# Patient Record
Sex: FEMALE | Race: WHITE | NOT HISPANIC OR LATINO | Employment: OTHER | URBAN - METROPOLITAN AREA
[De-identification: names, ages, dates, MRNs, and addresses within clinical notes are randomized per-mention and may not be internally consistent; named-entity substitution may affect disease eponyms.]

---

## 2017-06-02 ENCOUNTER — HOSPITAL ENCOUNTER (OUTPATIENT)
Facility: MEDICAL CENTER | Age: 37
End: 2017-06-02
Attending: FAMILY MEDICINE
Payer: COMMERCIAL

## 2017-06-02 ENCOUNTER — OFFICE VISIT (OUTPATIENT)
Dept: URGENT CARE | Facility: CLINIC | Age: 37
End: 2017-06-02
Payer: COMMERCIAL

## 2017-06-02 VITALS
BODY MASS INDEX: 20.99 KG/M2 | RESPIRATION RATE: 16 BRPM | OXYGEN SATURATION: 97 % | TEMPERATURE: 97.8 F | WEIGHT: 130 LBS | HEART RATE: 92 BPM | DIASTOLIC BLOOD PRESSURE: 70 MMHG | SYSTOLIC BLOOD PRESSURE: 112 MMHG

## 2017-06-02 DIAGNOSIS — N30.01 ACUTE CYSTITIS WITH HEMATURIA: ICD-10-CM

## 2017-06-02 LAB
APPEARANCE UR: NORMAL
BILIRUB UR STRIP-MCNC: NORMAL MG/DL
COLOR UR AUTO: NORMAL
GLUCOSE UR STRIP.AUTO-MCNC: NEGATIVE MG/DL
KETONES UR STRIP.AUTO-MCNC: NEGATIVE MG/DL
LEUKOCYTE ESTERASE UR QL STRIP.AUTO: NORMAL
NITRITE UR QL STRIP.AUTO: NORMAL
PH UR STRIP.AUTO: 5 [PH] (ref 5–8)
PROT UR QL STRIP: 100 MG/DL
RBC UR QL AUTO: NORMAL
SP GR UR STRIP.AUTO: 1.03
UROBILINOGEN UR STRIP-MCNC: 8 MG/DL

## 2017-06-02 PROCEDURE — 87186 SC STD MICRODIL/AGAR DIL: CPT

## 2017-06-02 PROCEDURE — 99203 OFFICE O/P NEW LOW 30 MIN: CPT | Performed by: FAMILY MEDICINE

## 2017-06-02 PROCEDURE — 81002 URINALYSIS NONAUTO W/O SCOPE: CPT | Performed by: FAMILY MEDICINE

## 2017-06-02 PROCEDURE — 99000 SPECIMEN HANDLING OFFICE-LAB: CPT | Performed by: FAMILY MEDICINE

## 2017-06-02 PROCEDURE — 87077 CULTURE AEROBIC IDENTIFY: CPT

## 2017-06-02 PROCEDURE — 87086 URINE CULTURE/COLONY COUNT: CPT

## 2017-06-02 RX ORDER — NITROFURANTOIN 25; 75 MG/1; MG/1
100 CAPSULE ORAL EVERY 12 HOURS
Qty: 10 CAP | Refills: 0 | Status: SHIPPED | OUTPATIENT
Start: 2017-06-02 | End: 2017-06-07

## 2017-06-02 RX ORDER — ALPRAZOLAM 1 MG/1
1 TABLET ORAL NIGHTLY PRN
COMMUNITY

## 2017-06-02 RX ORDER — CITALOPRAM HYDROBROMIDE 10 MG/1
10 TABLET ORAL DAILY
COMMUNITY
End: 2017-09-08

## 2017-06-02 ASSESSMENT — ENCOUNTER SYMPTOMS
DIZZINESS: 0
ARTHRALGIAS: 0
MYALGIAS: 0
ABDOMINAL PAIN: 0
ANOREXIA: 0
FEVER: 0
VOMITING: 0
SHORTNESS OF BREATH: 0
CHILLS: 1
FATIGUE: 1
NAUSEA: 0

## 2017-06-02 NOTE — MR AVS SNAPSHOT
Kimmie Bellpson   2017 4:45 PM   Office Visit   MRN: [id number]    Department:  [facility]   Dept Phone:  [phone]    Description:  Female : 1980   Provider:  Severo Espinosa M.D.           Reason for Visit     Urinary Frequency x today, urinary frequency and burning w/ urination      Allergies as of 2017     Allergen Noted Reactions    Pcn [Penicillins] 2010         Vital Signs     Blood Pressure Pulse Temperature Respirations Weight Oxygen Saturation    112/70 mmHg 92 36.6 °C (97.8 °F) 16 58.968 kg (130 lb) 97%      Basic Information     Date Of Birth Sex Race Ethnicity Preferred Language    1980 Female White Non- English      Problem List              ICD-10-CM Priority Class Noted - Resolved    Psoriasis L40.9   Unknown - Present    Acne vulgaris L70.0   Unknown - Present      Health Maintenance     Patient has no pending health maintenance at this time      Current Immunizations     No immunizations on file.      Below and/or attached are the medications your provider expects you to take. Review all of your home medications and newly ordered medications with your provider and/or pharmacist. Follow medication instructions as directed by your provider and/or pharmacist. Please keep your medication list with you and share with your provider. Update the information when medications are discontinued, doses are changed, or new medications (including over-the-counter products) are added; and carry medication information at all times in the event of emergency situations     Allergies:  PCN - (reactions not documented)               Medications  Valid as of: 2017 -  5:14 PM    Generic Name Brand Name Tablet Size Instructions for use    Acetaminophen   Take  by mouth.        ALPRAZolam (Tab) XANAX 1 MG Take 1 mg by mouth at bedtime as needed for Sleep.        Betamethasone Dipropionate (Cream) DIPROLENE 0.05 % Apply  to affected area(s) every day.        Citalopram Hydrobromide (Tab) CELEXA 10 MG Take 10 mg by mouth every day.        Diphenoxylate-Atropine (Tab) LOMOTIL 2.5-0.025 MG Take 1 Tab by mouth 4 times a day as needed for Diarrhea.        Hydrocodone-Chlorpheniramine (Liquid CR) TUSSIONEX 8-10 MG/5ML Take 5 mL by mouth every 12 hours as needed for Cough.        Ibuprofen   Take  by mouth.        Ipratropium Bromide HFA (Aero Soln) ATROVENT 17 MCG/ACT Inhale 2 Puffs by mouth 2 times a day.        Levonorgestrel (IUD) MIRENA 20 MCG/24HR 1 Each by Intrauterine route Once.        LORazepam (Tab) ATIVAN 0.5 MG Take 1 Tab by mouth every 8 hours as needed for Anxiety.        Minocycline HCl (Tab) DYNACIN 100 MG Take 1 Tab by mouth every day.        Sertraline HCl (Tab) ZOLOFT 50 MG Take 1 Tab by mouth every day.        Zolpidem Tartrate (Tab CR) AMBIEN CR 12.5 MG Take 1 Tab by mouth at bedtime as needed for Sleep.        Zolpidem Tartrate (Tab) AMBIEN 10 MG Take 1 Tab by mouth at bedtime as needed for Sleep.        .                 Medicines prescribed today were sent to:     [facility][id number] - [place] - [address] [address]    Phone: [phone] Fax: [phone]    Open 24 Hours?: No      Medication refill instructions:       If your prescription bottle indicates you have medication refills left, it is not necessary to call your provider’s office. Please contact your pharmacy and they will refill your medication.    If your prescription bottle indicates you do not have any refills left, you may request refills at any time through one of the following ways: The online [employer] system (except Urgent Care), by calling your provider’s office, or by asking your pharmacy to contact your provider’s office with a refill request. Medication refills are processed only during regular business hours and may not be available until the next business day. Your provider may request additional information or to have a follow-up visit with you prior to  refilling your medication.   *Please Note: Medication refills are assigned a new Rx number when refilled electronically. Your pharmacy may indicate that no refills were authorized even though a new prescription for the same medication is available at the pharmacy. Please request the medicine by name with the pharmacy before contacting your provider for a refill.           Loaded Commerce Access Code: [email or web address]  Expires: 7/2/2017  5:14 PM    Your email address is not on file at [facility].  Email Addresses are required for you to sign up for [employer]ed Commerce, please contact [phone] to verify your personal information and to provide your email address prior to attempting to register for Loaded Commerce.    Kimmie Elder  [address] [place] [address]    Loaded Commerce  A secure, online tool to manage your health information     [facility]’s Loaded Commerce® is a secure, online tool that connects you to your personalized health information from the privacy of your home -- day or night - making it very easy for you to manage your healthcare. Once the activation process is completed, you can even access your medical information using the Loaded Commerce lucian, which is available for free in the Apple Lucian store or Google Play store.     To learn more about Loaded Commerce, visit [email or web address]    There are two levels of access available (as shown below):   My Chart Features  [facility] Primary Care Doctor [facility]  Specialists [facility]  Urgent  Care Non-[facility] Primary Care Doctor   Email your healthcare team securely and privately 24/7 X X X    Manage appointments: schedule your next appointment; view details of past/upcoming appointments X      Request prescription refills. X      View recent personal medical records, including lab and immunizations X X X X   View health record, including health history, allergies, medications X X X X   Read reports about your outpatient visits, procedures, consult and ER notes X X X X   See your discharge summary, which  is a recap of your hospital and/or ER visit that includes your diagnosis, lab results, and care plan X X  X     How to register for [employer]MeYourLiteracy:  Once your e-mail address has been verified, follow the following steps to sign up for LendMeYourLiteracy.     1. Go to  [email or web address]  2. Click on the Sign Up Now box, which takes you to the New Member Sign Up page. You will need to provide the following information:  a. Enter your LendMeYourLiteracy Access Code exactly as it appears at the top of this page. (You will not need to use this code after you’ve completed the sign-up process. If you do not sign up before the expiration date, you must request a new code.)   b. Enter your date of birth.   c. Enter your home email address.   d. Click Submit, and follow the next screen’s instructions.  3. Create a LendMeYourLiteracy ID. This will be your LendMeYourLiteracy login ID and cannot be changed, so think of one that is secure and easy to remember.  4. Create a LendMeYourLiteracy password. You can change your password at any time.  5. Enter your Password Reset Question and Answer. This can be used at a later time if you forget your password.   6. Enter your e-mail address. This allows you to receive e-mail notifications when new information is available in LendMeYourLiteracy.  7. Click Sign Up. You can now view your health information.    For assistance activating your L[employer]MeYourLiteracy account, call [phone]         Quit Tobacco Information     Do you want to quit using tobacco?    Quitting tobacco decreases risks of cancer, heart and lung disease, increases life expectancy, improves sense of taste and smell, and increases spending money, among other benefits.    If you are thinking about quitting, we can help.  • [facility] Quit Tobacco Program: 821.925.9040  o Program occurs weekly for four weeks and includes pharmacist consultation on products to support quitting smoking or chewing tobacco. A provider referral is needed for pharmacist consultation.  • Tobacco Users Help Hotline:  1-800-QUIT-NOW (155-7384) or https://[place].quitlogix.org/  o Free, confidential telephone and online coaching for [place] residents. Sessions are designed on a schedule that is convenient for you. Eligible clients receive free nicotine replacement therapy.  • Nationally: www.smokefree.gov  o Information and professional assistance to support both immediate and long-term needs as you become, and remain, a non-smoker. Smokefree.gov allows you to choose the help that best fits your needs.

## 2017-06-03 NOTE — PROGRESS NOTES
Subjective:      Kimmie Elder is a 36 y.o. female who presents with Urinary Frequency            Urinary Frequency  This is a new problem. The current episode started today. The problem has been rapidly worsening. Associated symptoms include chills and fatigue. Pertinent negatives include no abdominal pain, anorexia, arthralgias, chest pain, fever, myalgias, nausea, rash or vomiting. Exacerbated by: urination. Treatments tried: AZO. The treatment provided no relief.       Review of Systems   Constitutional: Positive for chills and fatigue. Negative for fever.   Respiratory: Negative for shortness of breath.    Cardiovascular: Negative for chest pain.   Gastrointestinal: Negative for nausea, vomiting, abdominal pain and anorexia.   Musculoskeletal: Negative for myalgias and arthralgias.   Skin: Negative for rash.   Neurological: Negative for dizziness.     PMH:  has a past medical history of Psoriasis (age 16) and Acne vulgaris.  MEDS:   Current outpatient prescriptions:   •  citalopram (CELEXA) 10 MG tablet, Take 10 mg by mouth every day., Disp: , Rfl:   •  alprazolam (XANAX) 1 MG Tab, Take 1 mg by mouth at bedtime as needed for Sleep., Disp: , Rfl:   •  lorazepam (ATIVAN) 0.5 MG TABS, Take 1 Tab by mouth every 8 hours as needed for Anxiety., Disp: 25 Each, Rfl: 0  •  diphenoxylate-atropine (LOMOTIL) 2.5-0.025 MG TABS, Take 1 Tab by mouth 4 times a day as needed for Diarrhea., Disp: 30 Each, Rfl: 0  •  Ibuprofen (ADVIL PO), Take  by mouth., Disp: , Rfl:   •  Acetaminophen (TYLENOL PO), Take  by mouth., Disp: , Rfl:   •  levonorgestrel (MIRENA) 20 MCG/24HR IUD, 1 Each by Intrauterine route Once., Disp: , Rfl:   •  chlorpheniramine-hydrocodone (TUSSIONEX) 8-10 MG/5ML suspension, Take 5 mL by mouth every 12 hours as needed for Cough., Disp: 200 mL, Rfl: 0  •  ipratropium (ATROVENT HFA) 17 MCG/ACT AERS, Inhale 2 Puffs by mouth 2 times a day., Disp: 1 Inhaler, Rfl: 0  •  zolpidem (AMBIEN) 10 MG TABS, Take 1 Tab by  mouth at bedtime as needed for Sleep., Disp: 15 Each, Rfl: 3  •  zolpidem (AMBIEN CR) 12.5 MG CR tablet, Take 1 Tab by mouth at bedtime as needed for Sleep., Disp: 15 Tab, Rfl: 3  •  minocycline (DYNACIN) 100 MG tablet, Take 1 Tab by mouth every day., Disp: 60 Tab, Rfl: 11  •  sertraline (ZOLOFT) 50 MG TABS, Take 1 Tab by mouth every day., Disp: 30 Each, Rfl: 11  •  betamethasone dipropionate (DIPROLENE) 0.05 % CREA, Apply  to affected area(s) every day., Disp: , Rfl:   ALLERGIES:   Allergies   Allergen Reactions   • Pcn [Penicillins]      SURGHX:   Past Surgical History   Procedure Laterality Date   • Appendectomy  age 10     SOCHX:  reports that she has been smoking Cigarettes.  She does not have any smokeless tobacco history on file. She reports that she drinks alcohol.  FH: Family history was reviewed, no pertinent findings to report       Objective:     /70 mmHg  Pulse 92  Temp(Src) 36.6 °C (97.8 °F)  Resp 16  Wt 58.968 kg (130 lb)  SpO2 97%     Physical Exam   Constitutional: She appears well-developed. No distress.   HENT:   Head: Normocephalic.   Cardiovascular: Normal rate, regular rhythm and normal heart sounds.  Exam reveals no friction rub.    No murmur heard.  Pulmonary/Chest: Effort normal. No respiratory distress. She has no wheezes.   Abdominal: Soft. She exhibits no distension. There is tenderness in the suprapubic area. There is no rebound and no guarding.   No organomegaly   Neurological: She is alert.   Skin: Skin is dry. No rash noted. She is not diaphoretic.   Psychiatric: She has a normal mood and affect. Her behavior is normal.   no CVAT            Assessment/Plan:     1. Acute cystitis with hematuria [N30.01]  Urine Culture    nitrofurantoin monohydr macro (MACROBID) 100 MG Cap     Supportive care  Push fluids  Monitor temperature  Follow-up if symptoms worsen or fail to improve      ADDENDUM:  June 5, 2017  8:40 AM  Called and left message on voice mail  Culture result POSITIVE,  but SENSITIVE to nitrofurantoin  Advised that she should be feeling better, she should contact us or return for evaluation if she has any further issues or concerns

## 2017-06-04 LAB
BACTERIA UR CULT: ABNORMAL
SIGNIFICANT IND 70042: ABNORMAL
SOURCE SOURCE: ABNORMAL

## 2017-09-03 ENCOUNTER — OFFICE VISIT (OUTPATIENT)
Dept: URGENT CARE | Facility: CLINIC | Age: 37
End: 2017-09-03
Payer: COMMERCIAL

## 2017-09-03 VITALS
SYSTOLIC BLOOD PRESSURE: 106 MMHG | DIASTOLIC BLOOD PRESSURE: 70 MMHG | OXYGEN SATURATION: 93 % | BODY MASS INDEX: 20.73 KG/M2 | TEMPERATURE: 99.8 F | WEIGHT: 129 LBS | RESPIRATION RATE: 16 BRPM | HEART RATE: 111 BPM | HEIGHT: 66 IN

## 2017-09-03 DIAGNOSIS — J40 BRONCHITIS: ICD-10-CM

## 2017-09-03 PROCEDURE — 99214 OFFICE O/P EST MOD 30 MIN: CPT | Performed by: PHYSICIAN ASSISTANT

## 2017-09-03 RX ORDER — ALBUTEROL SULFATE 90 UG/1
2 AEROSOL, METERED RESPIRATORY (INHALATION) EVERY 4 HOURS PRN
Qty: 1 INHALER | Refills: 0 | Status: SHIPPED | OUTPATIENT
Start: 2017-09-03 | End: 2021-05-12

## 2017-09-03 RX ORDER — PROMETHAZINE HYDROCHLORIDE AND CODEINE PHOSPHATE 6.25; 1 MG/5ML; MG/5ML
5-10 SYRUP ORAL 3 TIMES DAILY PRN
Qty: 150 ML | Refills: 0 | Status: SHIPPED | OUTPATIENT
Start: 2017-09-03 | End: 2017-10-03

## 2017-09-03 RX ORDER — AZITHROMYCIN 500 MG/1
500 TABLET, FILM COATED ORAL DAILY
Qty: 10 TAB | Refills: 0 | Status: SHIPPED | OUTPATIENT
Start: 2017-09-03 | End: 2017-09-13

## 2017-09-03 ASSESSMENT — ENCOUNTER SYMPTOMS
EYES NEGATIVE: 1
WHEEZING: 0
SPUTUM PRODUCTION: 1
FEVER: 1
CARDIOVASCULAR NEGATIVE: 1
COUGH: 1
SHORTNESS OF BREATH: 0

## 2017-09-03 NOTE — PROGRESS NOTES
"Subjective:      Kimmie Elder is a 36 y.o. female who presents with Cough and Fever            Cough   This is a new problem. The current episode started in the past 7 days. The problem has been unchanged. The problem occurs every few minutes. The cough is productive of sputum. Associated symptoms include a fever. Pertinent negatives include no shortness of breath or wheezing. Nothing aggravates the symptoms. She has tried nothing for the symptoms. The treatment provided no relief. There is no history of asthma or environmental allergies.   Fever    Associated symptoms include coughing. Pertinent negatives include no wheezing.       Review of Systems   Constitutional: Positive for fever.   HENT: Negative.    Eyes: Negative.    Respiratory: Positive for cough and sputum production. Negative for shortness of breath and wheezing.    Cardiovascular: Negative.    Skin: Negative.    Endo/Heme/Allergies: Negative for environmental allergies.          Objective:     /70   Pulse (!) 111   Temp 37.7 °C (99.8 °F)   Resp 16   Ht 1.676 m (5' 5.98\")   Wt 58.5 kg (129 lb)   SpO2 93%   BMI 20.83 kg/m²      Physical Exam   Constitutional: She is oriented to person, place, and time. She appears well-developed and well-nourished. No distress.   HENT:   Head: Normocephalic and atraumatic.   Eyes: EOM are normal. Pupils are equal, round, and reactive to light.   Neck: Normal range of motion. Neck supple.   Cardiovascular: Normal rate.    Pulmonary/Chest: Effort normal and breath sounds normal. No respiratory distress. She has no wheezes. She has no rales.   Neurological: She is alert and oriented to person, place, and time.   Skin: Skin is warm and dry.   Psychiatric: She has a normal mood and affect. Her behavior is normal. Judgment and thought content normal.   Nursing note and vitals reviewed.    Vitals:    09/03/17 1645   BP: 106/70   Pulse: (!) 111   Resp: 16   Temp: 37.7 °C (99.8 °F)   SpO2: 93%   Weight: 58.5 kg " "(129 lb)   Height: 1.676 m (5' 5.98\")     Active Ambulatory Problems     Diagnosis Date Noted   • Psoriasis    • Acne vulgaris      Resolved Ambulatory Problems     Diagnosis Date Noted   • No Resolved Ambulatory Problems     Past Medical History:   Diagnosis Date   • Acne vulgaris    • Psoriasis age 16     Current Outpatient Prescriptions on File Prior to Visit   Medication Sig Dispense Refill   • citalopram (CELEXA) 10 MG tablet Take 10 mg by mouth every day.     • alprazolam (XANAX) 1 MG Tab Take 1 mg by mouth at bedtime as needed for Sleep.     • lorazepam (ATIVAN) 0.5 MG TABS Take 1 Tab by mouth every 8 hours as needed for Anxiety. 25 Each 0   • diphenoxylate-atropine (LOMOTIL) 2.5-0.025 MG TABS Take 1 Tab by mouth 4 times a day as needed for Diarrhea. 30 Each 0   • Ibuprofen (ADVIL PO) Take  by mouth.     • Acetaminophen (TYLENOL PO) Take  by mouth.     • levonorgestrel (MIRENA) 20 MCG/24HR IUD 1 Each by Intrauterine route Once.     • chlorpheniramine-hydrocodone (TUSSIONEX) 8-10 MG/5ML suspension Take 5 mL by mouth every 12 hours as needed for Cough. 200 mL 0   • ipratropium (ATROVENT HFA) 17 MCG/ACT AERS Inhale 2 Puffs by mouth 2 times a day. 1 Inhaler 0   • zolpidem (AMBIEN) 10 MG TABS Take 1 Tab by mouth at bedtime as needed for Sleep. 15 Each 3   • zolpidem (AMBIEN CR) 12.5 MG CR tablet Take 1 Tab by mouth at bedtime as needed for Sleep. 15 Tab 3   • minocycline (DYNACIN) 100 MG tablet Take 1 Tab by mouth every day. 60 Tab 11   • sertraline (ZOLOFT) 50 MG TABS Take 1 Tab by mouth every day. 30 Each 11   • betamethasone dipropionate (DIPROLENE) 0.05 % CREA Apply  to affected area(s) every day.       No current facility-administered medications on file prior to visit.      Gargles, Cepacol lozenges, Aleve/Advil as needed for throat pain  Family History   Problem Relation Age of Onset   • Diabetes Maternal Grandfather    • Cancer Paternal Grandmother      colon   • Heart Disease Neg Hx    • Stroke Neg Hx  "     Pcn [penicillins]              Assessment/Plan:     ·  bronchitis      · Start w/MucinexD; nsaids; [rx abx prn sx persist/worsen]  ·

## 2017-09-03 NOTE — LETTER
September 3, 2017         Patient: Kimmie Elder   YOB: 1980   Date of Visit: 9/3/2017           To Whom it May Concern:    Kimmie Elder was seen in my clinic on 9/3/2017 for acute illness; please excuse Tuesday, Wednesday [Thursday, as needed].    If you have any questions or concerns, please don't hesitate to call.        Sincerely,           Dash Young P.A.-C.  Electronically Signed

## 2017-09-08 ENCOUNTER — OFFICE VISIT (OUTPATIENT)
Dept: MEDICAL GROUP | Facility: PHYSICIAN GROUP | Age: 37
End: 2017-09-08
Payer: COMMERCIAL

## 2017-09-08 VITALS
HEIGHT: 65 IN | SYSTOLIC BLOOD PRESSURE: 98 MMHG | TEMPERATURE: 98.4 F | HEART RATE: 75 BPM | RESPIRATION RATE: 18 BRPM | BODY MASS INDEX: 22.16 KG/M2 | WEIGHT: 133 LBS | OXYGEN SATURATION: 95 % | DIASTOLIC BLOOD PRESSURE: 62 MMHG

## 2017-09-08 DIAGNOSIS — L70.0 ACNE VULGARIS: ICD-10-CM

## 2017-09-08 DIAGNOSIS — F41.9 ANXIETY: ICD-10-CM

## 2017-09-08 DIAGNOSIS — J40 BRONCHITIS: ICD-10-CM

## 2017-09-08 DIAGNOSIS — F33.42 RECURRENT MAJOR DEPRESSIVE DISORDER, IN FULL REMISSION (HCC): ICD-10-CM

## 2017-09-08 DIAGNOSIS — L40.9 PSORIASIS: ICD-10-CM

## 2017-09-08 PROCEDURE — 99214 OFFICE O/P EST MOD 30 MIN: CPT | Performed by: NURSE PRACTITIONER

## 2017-09-08 ASSESSMENT — PATIENT HEALTH QUESTIONNAIRE - PHQ9: CLINICAL INTERPRETATION OF PHQ2 SCORE: 0

## 2017-09-08 ASSESSMENT — PAIN SCALES - GENERAL: PAINLEVEL: 7=MODERATE-SEVERE PAIN

## 2017-09-08 NOTE — ASSESSMENT & PLAN NOTE
Seen in  on Sunday. Started 2 weeks ago. Started fevers 102-103 on Sunday. +chills, cough, congestion, green sputum, wheezing, headaches. negative n/v, negative upper respiratory congestion. Improved. Fevers resolved, congestion continues, SOB improved, continues green sputum, continues cough.

## 2017-09-09 NOTE — PROGRESS NOTES
Chief Complaint   Patient presents with   • Establish Care      went to urgent care with broncitis, cough       HISTORY OF THE PRESENT ILLNESS: This is a 36 y.o. female new patient to our practice. This pleasant patient is here todayTo discuss multiple issues and establish care.    Bronchitis  Seen in  on . Started 2 weeks ago. Started fevers 102-103 on . +chills, cough, congestion, green sputum, wheezing, headaches. negative n/v, negative upper respiratory congestion. Improved. Fevers resolved, congestion continues, SOB improved, continues green sputum, continues cough.     Psoriasis  Chronic in nature. Stable. Patient uses topical steroids as needed for this condition. Patient states that she has not had plaques recently.     Acne vulgaris  Chronic in nature.     Anxiety  Chronic in nature. Followed by psychiatry. Takes Xanax without side effects, counseled patient regarding risks of Xanax use including addiction, sedation, death.    Recurrent major depressive disorder, in full remission (CMS-HCC)  Chronic in nature. Stable. Patient states she took citalopram, tried multiple other medications in the past for this issue. Since states that overall she is feeling well at this time. Denies suicidal or homicidal ideations.      Past Medical History:   Diagnosis Date   • Acne vulgaris    • Anxiety    • Depression    • Psoriasis age 16       Past Surgical History:   Procedure Laterality Date   • APPENDECTOMY  age 10       Family Status   Relation Status   • Maternal Grandfather    • Paternal Grandmother    • Mother    • Father Alive   • Neg Hx      Family History   Problem Relation Age of Onset   • Diabetes Maternal Grandfather    • Heart Disease Maternal Grandfather      heart attack   • Cancer Paternal Grandmother      colon   • Hypertension Mother    • Prostate cancer Father      not cancer   • Stroke Neg Hx        Social History   Substance Use Topics   • Smoking status: Never Smoker   •  Smokeless tobacco: Never Used   • Alcohol use Yes      Comment: occ       Allergies: Pcn [penicillins]    Current Outpatient Prescriptions Ordered in [facility]   Medication Sig Dispense Refill   • azithromycin (ZITHROMAX) 500 MG tablet Take 1 Tab by mouth every day for 10 days. 10 Tab 0   • promethazine-codeine (PHENERGAN-CODEINE) 6.25-10 MG/5ML Syrup Take 5-10 mL by mouth 3 times a day as needed for Cough (or use qhs). 150 mL 0   • albuterol 108 (90 Base) MCG/ACT Aero Soln inhalation aerosol Inhale 2 Puffs by mouth every four hours as needed for Shortness of Breath. 1 Inhaler 0   • alprazolam (XANAX) 1 MG Tab Take 1 mg by mouth at bedtime as needed for Sleep.     • diphenoxylate-atropine (LOMOTIL) 2.5-0.025 MG TABS Take 1 Tab by mouth 4 times a day as needed for Diarrhea. 30 Each 0   • Ibuprofen (ADVIL PO) Take  by mouth.     • Acetaminophen (TYLENOL PO) Take  by mouth.       No current Epic-ordered facility-administered medications on file.        Review of Systems   Constitutional:  Negative for fever, chills, weight loss and malaise/fatigue.   HENT:  Negative for ear pain, nosebleeds, Positive congestion, negative sore throat and neck pain.    Eyes:  Negative for blurred vision.   Respiratory: Positive for cough, sputum production, shortness of breath and wheezing.    Cardiovascular:  Negative for chest pain, palpitations, orthopnea and leg swelling.   Gastrointestinal:  Negative for heartburn, nausea, vomiting and abdominal pain.   Genitourinary:  Negative for dysuria, urgency and frequency.   Musculoskeletal:  Negative for myalgias, back pain and joint pain.   Skin:  Negative for rash and itching.   Neurological:  Negative for dizziness, tingling, tremors, sensory change, focal weakness and headaches.   Endo/Heme/Allergies:  Does not bruise/bleed easily.   Psychiatric/Behavioral:  Negative for depression, anxiety, or memory loss.     All other systems reviewed and are negative except as in HPI.    Exam: Blood  "pressure (!) 98/62, pulse 75, temperature 36.9 °C (98.4 °F), resp. rate 18, height 1.651 m (5' 5\"), weight 60.3 kg (133 lb), last menstrual period 08/07/2017, SpO2 95 %, not currently breastfeeding.  General:  Normal appearing. No distress.  HEENT:  Normocephalic. Eyes conjunctiva clear lids without ptosis, pupils equal and reactive to light accommodation, ears normal shape and contour, canals are clear bilaterally, tympanic membranes with bilateral clear effusion, nasal mucosa erythematous, oropharynx is without erythema, positive cobblestoning.   Neck:  Supple without JVD or bruit. Thyroid is not enlarged.  Pulmonary: Lungs with rhonchi throughout.  Normal effort. No wheezing.  Cardiovascular:  Regular rate and rhythm without murmur. Carotid and radial pulses are intact and equal bilaterally.  Abdomen:  Soft, nontender, nondistended. Normal bowel sounds. Liver and spleen are not palpable  Neurologic:  Grossly nonfocal  Lymph:  No cervical, supraclavicular or axillary lymph nodes are palpable  Skin:  Warm and dry.  No obvious lesions.  Musculoskeletal:  Normal gait. No extremity cyanosis, clubbing, or edema.  Psych:  Normal mood and affect. Alert and oriented x3. Judgment and insight is normal.    PLAN:    1. Anxiety  Continue follow-up with psychiatry.    2. Recurrent major depressive disorder, in full remission (CMS-HCC)  Continue follow-up with psychiatry.    3. Bronchitis  Complete course of antibiotics as prescribed.   Use over-the-counter cough and cold medicine as needed.  Continue using cough syrup provided by urgent care as needed.   Continue inhaler as needed for cough, shortness of breath.   Continue humidifier   Drinking plenty of fluids, healthy diet, increased rest  Tylenol or ibuprofen as needed for aches and pains   She is encouraged to follow up if she is develops a new fever or if symptoms do not continue to resolve counseled patient that cough from bronchitis can last up to 6 weeks.  Discussed " with patient following up in the emergency room for shortness of breath not relieved by rest, albuterol, increasing fever, chills, decreased level of consciousness, abdominal pain, intractable nausea vomiting or diarrhea, chest pain or palpitations.    4. Psoriasis  Continue current plan of care    Follow up in one month or as needed.    Please note that this dictation was created using voice recognition software. I have made every reasonable attempt to correct obvious errors, but I expect that there are errors of grammar and possibly content that I did not discover before finalizing the note.      Assessment/Plan  1. Anxiety     2. Recurrent major depressive disorder, in full remission (CMS-HCC)     3. Bronchitis     4. Psoriasis     5. Acne vulgaris           I have placed the below orders and discussed them with an approved delegating provider. The MA is performing the below orders under the direction of Dr. Solis.

## 2017-09-09 NOTE — ASSESSMENT & PLAN NOTE
Chronic in nature. Followed by psychiatry. Takes Xanax without side effects, counseled patient regarding risks of Xanax use including addiction, sedation, death.

## 2017-09-09 NOTE — ASSESSMENT & PLAN NOTE
Chronic in nature. Stable. Patient states she took citalopram, tried multiple other medications in the past for this issue. Since states that overall she is feeling well at this time. Denies suicidal or homicidal ideations.

## 2017-10-02 ENCOUNTER — TELEPHONE (OUTPATIENT)
Dept: MEDICAL GROUP | Facility: PHYSICIAN GROUP | Age: 37
End: 2017-10-02

## 2017-10-02 NOTE — TELEPHONE ENCOUNTER
ESTABLISHED PATIENT PRE-VISIT PLANNING     Note: Patient will not be contacted if there is no indication to call.     1.  Reviewed notes from the last few office visits within the medical group: Yes    2.  If any orders were placed at last visit or intended to be done for this visit (i.e. 6 mos follow-up), do we have Results/Consult Notes?        •  Labs - Labs were not ordered at last office visit.       •  Imaging - Imaging was not ordered at last office visit.       •  Referrals - No referrals were ordered at last office visit.    3. Is this appointment scheduled as a Hospital Follow-Up? No    4.  Immunizations were updated in [employer] using WebIZ?: Yes       •  Web Iz Recommendations: FLU, HEPATITIS A , HEPATITIS B, TDAP and VARICELLA (Chicken Pox)     5.  Patient is due for the following Health Maintenance Topics:   Health Maintenance Due   Topic Date Due   • IMM DTaP/Tdap/Td Vaccine (3 - Tdap) 04/24/1999   • PAP SMEAR  10/07/1999   • IMM INFLUENZA (1) 09/01/2017       - Patient has completed FLU, HEPATITIS B and TD Immunization(s) per WebIZ. Chart has been updated.      6.  Patient was NOT informed to arrive 15 min prior to their scheduled appointment and bring in their medication bottles.

## 2017-10-03 ENCOUNTER — OFFICE VISIT (OUTPATIENT)
Dept: MEDICAL GROUP | Facility: PHYSICIAN GROUP | Age: 37
End: 2017-10-03
Payer: COMMERCIAL

## 2017-10-03 VITALS
SYSTOLIC BLOOD PRESSURE: 98 MMHG | WEIGHT: 128 LBS | HEIGHT: 65 IN | OXYGEN SATURATION: 98 % | DIASTOLIC BLOOD PRESSURE: 58 MMHG | BODY MASS INDEX: 21.33 KG/M2 | HEART RATE: 68 BPM | TEMPERATURE: 98.4 F | RESPIRATION RATE: 14 BRPM

## 2017-10-03 DIAGNOSIS — L40.9 PSORIASIS: ICD-10-CM

## 2017-10-03 DIAGNOSIS — L65.9 HAIR LOSS: ICD-10-CM

## 2017-10-03 DIAGNOSIS — F33.42 RECURRENT MAJOR DEPRESSIVE DISORDER, IN FULL REMISSION (HCC): ICD-10-CM

## 2017-10-03 DIAGNOSIS — Z00.00 WELLNESS EXAMINATION: ICD-10-CM

## 2017-10-03 DIAGNOSIS — Z23 FLU VACCINE NEED: ICD-10-CM

## 2017-10-03 DIAGNOSIS — T14.8XXA BRUISING: ICD-10-CM

## 2017-10-03 PROBLEM — J40 BRONCHITIS: Status: RESOLVED | Noted: 2017-09-08 | Resolved: 2017-10-03

## 2017-10-03 PROCEDURE — 90471 IMMUNIZATION ADMIN: CPT | Performed by: NURSE PRACTITIONER

## 2017-10-03 PROCEDURE — 90686 IIV4 VACC NO PRSV 0.5 ML IM: CPT | Performed by: NURSE PRACTITIONER

## 2017-10-03 PROCEDURE — 99214 OFFICE O/P EST MOD 30 MIN: CPT | Mod: 25 | Performed by: NURSE PRACTITIONER

## 2017-10-03 ASSESSMENT — PAIN SCALES - GENERAL: PAINLEVEL: NO PAIN

## 2017-10-03 NOTE — PROGRESS NOTES
"Chief Complaint   Patient presents with   • Hair/Scalp Problem     x 6 months    • Weight Loss   • Bleeding/Bruising       HISTORY OF PRESENT ILLNESS: Patient is a 36 y.o. female established patient who presents today toDiscuss hair loss.    Bruising  States she recently noticed a bruise on right thigh, right back of knee patient does not associate this with any injury. Resolved at this time.    Hair loss  States she has been noticed increased amounts of hair loss and hair thinning over the last 5 months. States she has changed shampoos, but has not noticed any change (change last 2 washing). States there is hair \"everywhere\". Patient is concerned does not have family history of thinning. No changes in diet, exercise, menses, hair care, dye, etc.    Psoriasis  Chronic in nature. Stable. Continues steroids as necessary. No plaques at this time.    Recurrent major depressive disorder, in full remission (CMS-HCC)  Patient has weaned off celexa and remeron and mood is stable. Does notice some increased fatigue. Continues follow-up with psychiatry. No SI/HI.      Patient Active Problem List    Diagnosis Date Noted   • Bruising 10/03/2017   • Hair loss 10/03/2017   • Anxiety 09/08/2017   • Recurrent major depressive disorder, in full remission (CMS-HCC) 09/08/2017   • Psoriasis        Allergies:Pcn [penicillins]    Current Outpatient Prescriptions   Medication Sig Dispense Refill   • albuterol 108 (90 Base) MCG/ACT Aero Soln inhalation aerosol Inhale 2 Puffs by mouth every four hours as needed for Shortness of Breath. 1 Inhaler 0   • alprazolam (XANAX) 1 MG Tab Take 1 mg by mouth at bedtime as needed for Sleep.     • Ibuprofen (ADVIL PO) Take  by mouth.     • Acetaminophen (TYLENOL PO) Take  by mouth.       No current facility-administered medications for this visit.        Social History   Substance Use Topics   • Smoking status: Never Smoker   • Smokeless tobacco: Never Used   • Alcohol use 0.6 oz/week     1 Cans of " "beer per week      Comment: occ       Family Status   Relation Status   • Maternal Grandfather    • Paternal Grandmother    • Mother    • Father Alive   • Neg Hx      Family History   Problem Relation Age of Onset   • Diabetes Maternal Grandfather    • Heart Disease Maternal Grandfather      heart attack   • Cancer Paternal Grandmother      colon   • Hypertension Mother    • Prostate cancer Father      not cancer   • Stroke Neg Hx        Review of Systems:   Constitutional:  Negative for fever, chills, weight loss and malaise/fatigue.   HEENT:  Negative for ear pain, nosebleeds, congestion, sore throat and neck pain.    Eyes:  Negative for blurred vision.   Respiratory:  Negative for cough, sputum production, shortness of breath and wheezing.    Cardiovascular: Negative for chest pain, palpitations, orthopnea and leg swelling.   Gastrointestinal:  Negative for heartburn, nausea, vomiting and abdominal pain.   Genitourinary:  Negative for dysuria, urgency and frequency.   Musculoskeletal:  Negative for myalgias, back pain and joint pain.   Skin:  Negative for rash and itching.   Neurological:  Negative for dizziness, tingling, tremors, sensory change, focal weakness and headaches.   Endo/Heme/Allergies:  Does bruise, does not bleed easily.   Psychiatric/Behavioral:  Negative for depression, suicidal ideas and memory loss.  The patient is not nervous/anxious and does not have insomnia.    All other systems reviewed and are negative except as in HPI.    Exam:  Blood pressure (!) 98/58, pulse 68, temperature 36.9 °C (98.4 °F), resp. rate 14, height 1.651 m (5' 5\"), weight 58.1 kg (128 lb), last menstrual period 2017, SpO2 98 %, not currently breastfeeding.  General:  Normal appearing. No distress.  HEENT:  Normocephalic. Eyes conjunctiva clear lids without ptosis, pupils equal and reactive to light accommodation, ears normal shape and contour, canals are clear bilaterally, tympanic membranes are benign, " nasal mucosa benign, oropharynx is without erythema, edema or exudates.   Neck:  Supple without JVD or bruit. Thyroid is not enlarged.  Pulmonary:  Clear to ausculation.  Normal effort. No rales, ronchi, or wheezing.  Cardiovascular:  Regular rate and rhythm without murmur. Carotid and radial pulses are intact and equal bilaterally.  Abdomen:  Soft, nontender, nondistended. Normal bowel sounds. Liver and spleen are not palpable  Neurologic:  Grossly nonfocal  Lymph:  No cervical, supraclavicular or axillary lymph nodes are palpable  Skin:  Warm and dry.  No obvious lesions. No bald spots, patient states hair is thinning, no noted bruises, psoriasis.  Musculoskeletal:  Normal gait. No extremity cyanosis, clubbing, or edema.  Psych:  Normal mood and affect. Alert and oriented x3. Judgment and insight is normal.      PLAN:    1. Flu vaccine need  - Flu Quad Inj >3 Year Pre-Filled (Preservative Free)    2. Bruising  Plan to obtain basic labs, she states that these have generally resolved and she is unsure if she needed the appointment patient believes that symptoms are related to stopping Celexa, Remeron.  - CBC WITH DIFFERENTIAL; Future  - COMP METABOLIC PANEL; Future  - LIPID PROFILE; Future  - PROTHROMBIN TIME; Future    3. Hair loss  Plan to obtain basic labs. Patient continues to have hair loss.  - CBC WITH DIFFERENTIAL; Future  - COMP METABOLIC PANEL; Future  - LIPID PROFILE; Future  - TSH+FREE T4    4. Psoriasis  Current clinic care.    5. Recurrent major depressive disorder, in full remission (CMS-HCC)  Continue follow-up with psychiatry.    6. Wellness examination  - LIPID PROFILE; Future    Follow-up as needed based on lab results. Patient is encouraged to be seen in the emergency room for chest pain, palpitations, shortness of breath, dizziness, severe abdominal pain or other concerning symptoms.    Please note that this dictation was created using voice recognition software. I have made every reasonable  attempt to correct obvious errors, but I expect that there are errors of grammar and possibly content that I did not discover before finalizing the note.    Assessment/Plan:  1. Flu vaccine need  Flu Quad Inj >3 Year Pre-Filled (Preservative Free)   2. Bruising  CBC WITH DIFFERENTIAL    COMP METABOLIC PANEL    LIPID PROFILE    PROTHROMBIN TIME   3. Hair loss  CBC WITH DIFFERENTIAL    COMP METABOLIC PANEL    LIPID PROFILE    TSH+FREE T4   4. Psoriasis     5. Recurrent major depressive disorder, in full remission (CMS-HCC)     6. Wellness examination  LIPID PROFILE          I have placed the below orders and discussed them with an approved delegating provider. The MA is performing the below orders under the direction of Dr. Baldwin.

## 2017-10-03 NOTE — ASSESSMENT & PLAN NOTE
States she recently noticed a bruise on right thigh, right back of knee patient does not associate this with any injury. Resolved at this time.

## 2017-10-03 NOTE — ASSESSMENT & PLAN NOTE
"States she has been noticed increased amounts of hair loss and hair thinning over the last 5 months. States she has changed shampoos, but has not noticed any change (change last 2 washing). States there is hair \"everywhere\". Patient is concerned does not have family history of thinning. No changes in diet, exercise, menses, hair care, dye, etc.  "

## 2017-10-03 NOTE — ASSESSMENT & PLAN NOTE
Patient has weaned off celexa and remeron and mood is stable. Does notice some increased fatigue. Continues follow-up with psychiatry. No SI/HI.

## 2017-10-04 ENCOUNTER — HOSPITAL ENCOUNTER (OUTPATIENT)
Dept: LAB | Facility: MEDICAL CENTER | Age: 37
End: 2017-10-04
Attending: NURSE PRACTITIONER
Payer: COMMERCIAL

## 2017-10-04 ENCOUNTER — PATIENT MESSAGE (OUTPATIENT)
Dept: MEDICAL GROUP | Facility: PHYSICIAN GROUP | Age: 37
End: 2017-10-04

## 2017-10-04 DIAGNOSIS — L65.9 HAIR LOSS: ICD-10-CM

## 2017-10-04 DIAGNOSIS — T14.8XXA BRUISING: ICD-10-CM

## 2017-10-04 DIAGNOSIS — Z00.00 WELLNESS EXAMINATION: ICD-10-CM

## 2017-10-04 LAB
ALBUMIN SERPL BCP-MCNC: 4.4 G/DL (ref 3.2–4.9)
ALBUMIN/GLOB SERPL: 1.9 G/DL
ALP SERPL-CCNC: 50 U/L (ref 30–99)
ALT SERPL-CCNC: 14 U/L (ref 2–50)
ANION GAP SERPL CALC-SCNC: 8 MMOL/L (ref 0–11.9)
AST SERPL-CCNC: 17 U/L (ref 12–45)
BASOPHILS # BLD AUTO: 0.3 % (ref 0–1.8)
BASOPHILS # BLD: 0.02 K/UL (ref 0–0.12)
BILIRUB SERPL-MCNC: 0.9 MG/DL (ref 0.1–1.5)
BUN SERPL-MCNC: 9 MG/DL (ref 8–22)
CALCIUM SERPL-MCNC: 9.4 MG/DL (ref 8.5–10.5)
CHLORIDE SERPL-SCNC: 106 MMOL/L (ref 96–112)
CHOLEST SERPL-MCNC: 190 MG/DL (ref 100–199)
CO2 SERPL-SCNC: 25 MMOL/L (ref 20–33)
CREAT SERPL-MCNC: 0.66 MG/DL (ref 0.5–1.4)
EOSINOPHIL # BLD AUTO: 0.08 K/UL (ref 0–0.51)
EOSINOPHIL NFR BLD: 1.2 % (ref 0–6.9)
ERYTHROCYTE [DISTWIDTH] IN BLOOD BY AUTOMATED COUNT: 45.6 FL (ref 35.9–50)
GFR SERPL CREATININE-BSD FRML MDRD: >60 ML/MIN/1.73 M 2
GLOBULIN SER CALC-MCNC: 2.3 G/DL (ref 1.9–3.5)
GLUCOSE SERPL-MCNC: 83 MG/DL (ref 65–99)
HCT VFR BLD AUTO: 43.4 % (ref 37–47)
HDLC SERPL-MCNC: 69 MG/DL
HGB BLD-MCNC: 14.9 G/DL (ref 12–16)
IMM GRANULOCYTES # BLD AUTO: 0.02 K/UL (ref 0–0.11)
IMM GRANULOCYTES NFR BLD AUTO: 0.3 % (ref 0–0.9)
INR PPP: 1.06 (ref 0.87–1.13)
LDLC SERPL CALC-MCNC: 107 MG/DL
LYMPHOCYTES # BLD AUTO: 1.35 K/UL (ref 1–4.8)
LYMPHOCYTES NFR BLD: 20.9 % (ref 22–41)
MCH RBC QN AUTO: 31.6 PG (ref 27–33)
MCHC RBC AUTO-ENTMCNC: 34.3 G/DL (ref 33.6–35)
MCV RBC AUTO: 92.1 FL (ref 81.4–97.8)
MONOCYTES # BLD AUTO: 0.33 K/UL (ref 0–0.85)
MONOCYTES NFR BLD AUTO: 5.1 % (ref 0–13.4)
NEUTROPHILS # BLD AUTO: 4.66 K/UL (ref 2–7.15)
NEUTROPHILS NFR BLD: 72.2 % (ref 44–72)
NRBC # BLD AUTO: 0 K/UL
NRBC BLD AUTO-RTO: 0 /100 WBC
PLATELET # BLD AUTO: 176 K/UL (ref 164–446)
PMV BLD AUTO: 12.4 FL (ref 9–12.9)
POTASSIUM SERPL-SCNC: 4 MMOL/L (ref 3.6–5.5)
PROT SERPL-MCNC: 6.7 G/DL (ref 6–8.2)
PROTHROMBIN TIME: 14.1 SEC (ref 12–14.6)
RBC # BLD AUTO: 4.71 M/UL (ref 4.2–5.4)
SODIUM SERPL-SCNC: 139 MMOL/L (ref 135–145)
T4 FREE SERPL-MCNC: 0.92 NG/DL (ref 0.53–1.43)
TRIGL SERPL-MCNC: 68 MG/DL (ref 0–149)
TSH SERPL DL<=0.005 MIU/L-ACNC: 1.91 UIU/ML (ref 0.3–3.7)
WBC # BLD AUTO: 6.5 K/UL (ref 4.8–10.8)

## 2017-10-04 PROCEDURE — 80061 LIPID PANEL: CPT

## 2017-10-04 PROCEDURE — 85025 COMPLETE CBC W/AUTO DIFF WBC: CPT

## 2017-10-04 PROCEDURE — 36415 COLL VENOUS BLD VENIPUNCTURE: CPT

## 2017-10-04 PROCEDURE — 80053 COMPREHEN METABOLIC PANEL: CPT

## 2017-10-04 PROCEDURE — 85610 PROTHROMBIN TIME: CPT

## 2017-10-04 PROCEDURE — 84439 ASSAY OF FREE THYROXINE: CPT

## 2017-10-04 PROCEDURE — 84443 ASSAY THYROID STIM HORMONE: CPT

## 2017-10-05 ENCOUNTER — TELEPHONE (OUTPATIENT)
Dept: MEDICAL GROUP | Facility: PHYSICIAN GROUP | Age: 37
End: 2017-10-05

## 2017-10-05 NOTE — TELEPHONE ENCOUNTER
Called and spoke with patient regarding labs. Patient made an appointment 10/10. Patient wants to know if everything is good with her labs why does she need to schedule a follow up. NEHEMIAS

## 2017-10-05 NOTE — TELEPHONE ENCOUNTER
----- Message from MARI Field sent at 10/5/2017  1:00 PM PDT -----  Please call pt and give lab results: All labs are generally within normal limits. Please schedule follow-up to discuss plan.

## 2017-10-10 ENCOUNTER — OFFICE VISIT (OUTPATIENT)
Dept: MEDICAL GROUP | Facility: PHYSICIAN GROUP | Age: 37
End: 2017-10-10
Payer: COMMERCIAL

## 2017-10-10 VITALS
RESPIRATION RATE: 14 BRPM | WEIGHT: 134 LBS | DIASTOLIC BLOOD PRESSURE: 64 MMHG | SYSTOLIC BLOOD PRESSURE: 110 MMHG | TEMPERATURE: 98.7 F | HEIGHT: 65 IN | BODY MASS INDEX: 22.33 KG/M2 | HEART RATE: 77 BPM | OXYGEN SATURATION: 99 %

## 2017-10-10 DIAGNOSIS — R23.2 HOT FLASHES: ICD-10-CM

## 2017-10-10 DIAGNOSIS — L65.9 HAIR LOSS: ICD-10-CM

## 2017-10-10 DIAGNOSIS — F33.42 RECURRENT MAJOR DEPRESSIVE DISORDER, IN FULL REMISSION (HCC): ICD-10-CM

## 2017-10-10 DIAGNOSIS — F41.9 ANXIETY: ICD-10-CM

## 2017-10-10 PROBLEM — T14.8XXA BRUISING: Status: RESOLVED | Noted: 2017-10-03 | Resolved: 2017-10-10

## 2017-10-10 PROCEDURE — 99214 OFFICE O/P EST MOD 30 MIN: CPT | Performed by: NURSE PRACTITIONER

## 2017-10-10 RX ORDER — CITALOPRAM HYDROBROMIDE 10 MG/1
10 TABLET ORAL DAILY
Qty: 30 TAB | Refills: 0 | Status: SHIPPED | OUTPATIENT
Start: 2017-10-10 | End: 2018-01-12 | Stop reason: SDUPTHER

## 2017-10-10 ASSESSMENT — PAIN SCALES - GENERAL: PAINLEVEL: NO PAIN

## 2017-10-10 NOTE — ASSESSMENT & PLAN NOTE
States that she will notice hot flashes. States that these are happening 1-2 times per week. States that she becomes extremely hot and sweaty and cold.  States she will become suddenly hot and sweaty states that she is unsure if these are stress related. States she is having increased stress with a 10 year old with ADHD who is unmedicated related to side effects of the medications.

## 2017-10-11 NOTE — ASSESSMENT & PLAN NOTE
Chronic in nature. Worsening. Patient is followed by psychiatry. Patient does take Xanax intermittently. States that she has been having a lot of increasing stress with her son's ADHD, school meetings, work. States that in the past she was on Celexa for depression and states that she was unaware this would help with anxiety. Patient did not have side effects from this medication states that she felt a lot better while taking it. Patient states that she is concerned that some of her hair loss could be related to stress.

## 2017-10-11 NOTE — ASSESSMENT & PLAN NOTE
She continues to have hair loss thyroid labs are within normal limits counseled patient that this could possibly be related to increased stress or could also be related to hormone changes discussed with patient that since she is having hot flashes as well as hair loss it would be wise to check some hormone levels these are ordered at this time. Patient states that her last continues to be significant and she is fearful of becoming bald.

## 2017-11-20 ENCOUNTER — APPOINTMENT (OUTPATIENT)
Dept: MEDICAL GROUP | Facility: PHYSICIAN GROUP | Age: 37
End: 2017-11-20

## 2018-01-12 DIAGNOSIS — F41.9 ANXIETY: ICD-10-CM

## 2018-01-12 DIAGNOSIS — F33.42 RECURRENT MAJOR DEPRESSIVE DISORDER, IN FULL REMISSION (HCC): ICD-10-CM

## 2018-01-12 RX ORDER — CITALOPRAM HYDROBROMIDE 10 MG/1
10 TABLET ORAL DAILY
Qty: 90 TAB | Refills: 0 | Status: SHIPPED | OUTPATIENT
Start: 2018-01-12 | End: 2018-04-25 | Stop reason: SDUPTHER

## 2018-02-01 ENCOUNTER — TELEPHONE (OUTPATIENT)
Dept: MEDICAL GROUP | Facility: PHYSICIAN GROUP | Age: 38
End: 2018-02-01

## 2018-02-02 ENCOUNTER — OFFICE VISIT (OUTPATIENT)
Dept: MEDICAL GROUP | Facility: PHYSICIAN GROUP | Age: 38
End: 2018-02-02
Payer: COMMERCIAL

## 2018-02-02 VITALS
SYSTOLIC BLOOD PRESSURE: 108 MMHG | RESPIRATION RATE: 16 BRPM | OXYGEN SATURATION: 100 % | HEIGHT: 65 IN | TEMPERATURE: 99.8 F | WEIGHT: 134 LBS | HEART RATE: 77 BPM | BODY MASS INDEX: 22.33 KG/M2 | DIASTOLIC BLOOD PRESSURE: 68 MMHG

## 2018-02-02 DIAGNOSIS — A09 TRAVELER'S DIARRHEA: ICD-10-CM

## 2018-02-02 DIAGNOSIS — F41.9 ANXIETY: ICD-10-CM

## 2018-02-02 DIAGNOSIS — F33.42 RECURRENT MAJOR DEPRESSIVE DISORDER, IN FULL REMISSION (HCC): ICD-10-CM

## 2018-02-02 PROCEDURE — 99213 OFFICE O/P EST LOW 20 MIN: CPT | Performed by: NURSE PRACTITIONER

## 2018-02-02 RX ORDER — AZITHROMYCIN 250 MG/1
TABLET, FILM COATED ORAL
Qty: 6 TAB | Refills: 0 | Status: SHIPPED | OUTPATIENT
Start: 2018-02-02 | End: 2018-04-26

## 2018-02-02 ASSESSMENT — PAIN SCALES - GENERAL: PAINLEVEL: NO PAIN

## 2018-02-02 NOTE — TELEPHONE ENCOUNTER
ESTABLISHED PATIENT PRE-VISIT PLANNING     Note: Patient will not be contacted if there is no indication to call.     1.  Reviewed notes from the last few office visits within the medical group: Yes    2.  If any orders were placed at last visit or intended to be done for this visit (i.e. 6 mos follow-up), do we have Results/Consult Notes?        •  Labs - Labs ordered, NOT completed. Patient advised to complete prior to next appointment.   Note: If patient appointment is for lab review and patient did not complete labs, check with provider if OK to reschedule patient until labs completed.       •  Imaging - Imaging was not ordered at last office visit.       •  Referrals - Referral ordered, patient has NOT been seen.    3. Is this appointment scheduled as a Hospital Follow-Up? No    4.  Immunizations were updated in [employer] using WebIZ?: Yes       •  Web Iz Recommendations: HEPATITIS A , HEPATITIS B, TDAP and VARICELLA (Chicken Pox)     5.  Patient is due for the following Health Maintenance Topics:   Health Maintenance Due   Topic Date Due   • PAP SMEAR  10/07/1999       - Patient has completed FLU, HEPATITIS B and TD Immunization(s) per WebIZ. Chart has been updated.    6.  Patient was NOT informed to arrive 15 min prior to their scheduled appointment and bring in their medication bottles.

## 2018-02-03 NOTE — ASSESSMENT & PLAN NOTE
Chronic in nature. Stable. Patient is currently taking Celexa 10 mg and states that this is working well for her symptoms. Patient denies side effects from this medication.

## 2018-02-03 NOTE — ASSESSMENT & PLAN NOTE
This is a new problem. Patient is going on a ten-day cruise and is requesting azithromycin in case she has diarrhea.

## 2018-02-03 NOTE — ASSESSMENT & PLAN NOTE
Chronic in nature. Stable. Patient continues to take Celexa 10 mg as needed and states that this is helping with her symptoms. Denies suicidal or homicidal ideation.

## 2018-02-03 NOTE — PROGRESS NOTES
Chief Complaint   Patient presents with   • Medication Refill       HISTORY OF PRESENT ILLNESS: Patient is a 37 y.o. female established patient who presents today to  to follow up on anxiety, requesting medication for possible traveler's diarrhea.    Anxiety  Chronic in nature. Stable. Patient is currently taking Celexa 10 mg and states that this is working well for her symptoms. Patient denies side effects from this medication.    Recurrent major depressive disorder, in full remission (CMS-HCC)  Chronic in nature. Stable. Patient continues to take Celexa 10 mg as needed and states that this is helping with her symptoms. Denies suicidal or homicidal ideation.    Traveler's diarrhea  This is a new problem. Patient is going on a ten-day cruise and is requesting azithromycin in case she has diarrhea.     Patient does note that she had some mild burning with urination 2 days ago but states that this did resolve with increased water consumption.    Patient Active Problem List    Diagnosis Date Noted   • Traveler's diarrhea 02/02/2018   • Hot flashes 10/10/2017   • Hair loss 10/03/2017   • Anxiety 09/08/2017   • Recurrent major depressive disorder, in full remission (CMS-[facility]) 09/08/2017   • Psoriasis        Allergies:Pcn [penicillins]    Current Outpatient Prescriptions   Medication Sig Dispense Refill   • azithromycin (ZITHROMAX) 250 MG Tab 500 mg take 2 tabs once, 250 mg daily for 4 days. 6 Tab 0   • citalopram (CELEXA) 10 MG tablet Take 1 Tab by mouth every day. 90 Tab 0   • albuterol 108 (90 Base) MCG/ACT Aero Soln inhalation aerosol Inhale 2 Puffs by mouth every four hours as needed for Shortness of Breath. 1 Inhaler 0   • alprazolam (XANAX) 1 MG Tab Take 1 mg by mouth at bedtime as needed for Sleep.     • Ibuprofen (ADVIL PO) Take  by mouth.     • Acetaminophen (TYLENOL PO) Take  by mouth.       No current facility-administered medications for this visit.        Social History   Substance Use Topics   • Smoking  "status: Never Smoker   • Smokeless tobacco: Never Used   • Alcohol use 0.6 oz/week     1 Cans of beer per week      Comment: occ       Family Status   Relation Status   • Maternal Grandfather    • Paternal Grandmother    • Mother    • Father Alive   • Neg Hx      Family History   Problem Relation Age of Onset   • Diabetes Maternal Grandfather    • Heart Disease Maternal Grandfather      heart attack   • Cancer Paternal Grandmother      colon   • Hypertension Mother    • Prostate cancer Father      not cancer   • Stroke Neg Hx        Review of Systems:   Constitutional:  Negative for fever, chills, weight loss and malaise/fatigue.   HEENT:  Negative for ear pain, nosebleeds, congestion, sore throat and neck pain.    Eyes:  Negative for blurred vision.   Respiratory:  Negative for cough, sputum production, shortness of breath and wheezing.    Cardiovascular:  Negative for chest pain, palpitations, orthopnea and leg swelling.   Gastrointestinal:  Negative for heartburn, nausea, vomiting and abdominal pain.   Genitourinary:  Negative for dysuria, urgency and frequency.   Musculoskeletal:  Negative for myalgias, back pain and joint pain.   Skin:  Negative for rash and itching.   Neurological:  Negative for dizziness, tingling, tremors, sensory change, focal weakness and headaches.   Endo/Heme/Allergies:  Does not bruise/bleed easily.   Psychiatric/Behavioral:  Negative for depression, suicidal ideas and memory loss.  The patient is not nervous/anxious and does not have insomnia.    All other systems reviewed and are negative except as in HPI.    Exam:  Blood pressure 108/68, pulse 77, temperature 37.7 °C (99.8 °F), resp. rate 16, height 1.651 m (5' 5\"), weight 60.8 kg (134 lb), SpO2 100 %, not currently breastfeeding.  General:  Normal appearing. No distress.  Pulmonary:  Clear to ausculation.  Normal effort. No rales, ronchi, or wheezing.  Cardiovascular:  Regular rate and rhythm without murmur. Carotid and " radial pulses are intact and equal bilaterally.  Neurologic:  Grossly nonfocal  Skin:  Warm and dry.  No obvious lesions.  Musculoskeletal:  Normal gait. No extremity cyanosis, clubbing, or edema.  Psych:  Normal mood and affect. Alert and oriented x3. Judgment and insight is normal.      PLAN:    1. Traveler's diarrhea  - azithromycin (ZITHROMAX) 250 MG Tab; 500 mg take 2 tabs once, 250 mg daily for 4 days.  Dispense: 6 Tab; Refill: 0    2. Anxiety  3. Recurrent major depressive disorder, in full remission (CMS-[facility])  Continue Celexa.    Follow-up as needed. Patient is encouraged to be seen in the emergency room for chest pain, palpitations, shortness of breath, dizziness, severe abdominal pain or other concerning symptoms.    Please note that this dictation was created using voice recognition software. I have made every reasonable attempt to correct obvious errors, but I expect that there are errors of grammar and possibly content that I did not discover before finalizing the note.    Assessment/Plan:  1. Traveler's diarrhea  azithromycin (ZITHROMAX) 250 MG Tab   2. Anxiety     3. Recurrent major depressive disorder, in full remission (CMS-HCC)

## 2018-04-25 DIAGNOSIS — F33.42 RECURRENT MAJOR DEPRESSIVE DISORDER, IN FULL REMISSION (HCC): ICD-10-CM

## 2018-04-25 DIAGNOSIS — F41.9 ANXIETY: ICD-10-CM

## 2018-04-26 RX ORDER — CITALOPRAM HYDROBROMIDE 10 MG/1
10 TABLET ORAL DAILY
Qty: 90 TAB | Refills: 0 | Status: SHIPPED | OUTPATIENT
Start: 2018-04-26 | End: 2018-07-23 | Stop reason: SDUPTHER

## 2018-06-19 ENCOUNTER — APPOINTMENT (RX ONLY)
Dept: URBAN - METROPOLITAN AREA CLINIC 20 | Facility: CLINIC | Age: 38
Setting detail: DERMATOLOGY
End: 2018-06-19

## 2018-06-19 DIAGNOSIS — L70.8 OTHER ACNE: ICD-10-CM

## 2018-06-19 DIAGNOSIS — L90.5 SCAR CONDITIONS AND FIBROSIS OF SKIN: ICD-10-CM

## 2018-06-19 PROBLEM — F32.9 MAJOR DEPRESSIVE DISORDER, SINGLE EPISODE, UNSPECIFIED: Status: ACTIVE | Noted: 2018-06-19

## 2018-06-19 PROCEDURE — 99202 OFFICE O/P NEW SF 15 MIN: CPT

## 2018-06-19 PROCEDURE — ? ORDER TESTS

## 2018-06-19 PROCEDURE — ? COUNSELING

## 2018-06-19 PROCEDURE — ? ADDITIONAL NOTES

## 2018-06-19 ASSESSMENT — LOCATION SIMPLE DESCRIPTION DERM
LOCATION SIMPLE: RIGHT LIP
LOCATION SIMPLE: LEFT LIP

## 2018-06-19 ASSESSMENT — LOCATION DETAILED DESCRIPTION DERM
LOCATION DETAILED: RIGHT LOWER CUTANEOUS LIP
LOCATION DETAILED: LEFT LOWER CUTANEOUS LIP

## 2018-06-19 ASSESSMENT — LOCATION ZONE DERM: LOCATION ZONE: LIP

## 2018-06-19 NOTE — PROCEDURE: ADDITIONAL NOTES
Additional Notes: Baseline BMP and potassium ordered.\\n\\nWill call patient in 1 month, if labs WNL will call in spironolactone 25mg bid.

## 2018-06-19 NOTE — HPI: PIMPLES (ACNE)
How Severe Is Your Acne?: mild
Is This A New Presentation, Or A Follow-Up?: Acne
Additional Comments (Use Complete Sentences): Pt has taking antibiotics for many years off and on (doxy, Minocycline and tetracycline), differin.
Females Only: When Was Your Last Menstrual Period?: 6/1/18

## 2018-07-23 DIAGNOSIS — F33.42 RECURRENT MAJOR DEPRESSIVE DISORDER, IN FULL REMISSION (HCC): ICD-10-CM

## 2018-07-23 DIAGNOSIS — F41.9 ANXIETY: ICD-10-CM

## 2018-07-23 RX ORDER — CITALOPRAM HYDROBROMIDE 10 MG/1
TABLET ORAL
Qty: 90 TAB | Refills: 0 | Status: SHIPPED | OUTPATIENT
Start: 2018-07-23 | End: 2022-03-11

## 2019-02-08 ENCOUNTER — OFFICE VISIT (OUTPATIENT)
Dept: URGENT CARE | Facility: CLINIC | Age: 39
End: 2019-02-08
Payer: COMMERCIAL

## 2019-02-08 VITALS
HEART RATE: 82 BPM | BODY MASS INDEX: 21.26 KG/M2 | DIASTOLIC BLOOD PRESSURE: 80 MMHG | SYSTOLIC BLOOD PRESSURE: 102 MMHG | HEIGHT: 65 IN | TEMPERATURE: 98.4 F | WEIGHT: 127.6 LBS | RESPIRATION RATE: 16 BRPM | OXYGEN SATURATION: 96 %

## 2019-02-08 DIAGNOSIS — R07.9 CHEST PAIN, UNSPECIFIED TYPE: ICD-10-CM

## 2019-02-08 PROCEDURE — 99213 OFFICE O/P EST LOW 20 MIN: CPT | Performed by: FAMILY MEDICINE

## 2019-02-08 ASSESSMENT — ENCOUNTER SYMPTOMS
BACK PAIN: 1
DIZZINESS: 0
FEVER: 0
EXERTIONAL CHEST PRESSURE: 0
VOMITING: 0
PALPITATIONS: 0
LOWER EXTREMITY EDEMA: 0
NAUSEA: 0

## 2019-02-09 NOTE — PROGRESS NOTES
"Subjective:   Kimmie Elder is a 38 y.o. female who presents for Chest Pain (x around 10 month, mid chest pain, tightness, shortness of breath, feeling very tired, Lt. upper back pain and left arm pain.  Pains are getting more frequent)     Chest Pain    This is a new problem. The current episode started more than 1 month ago. The onset quality is sudden. The problem occurs intermittently. The problem has been waxing and waning. The pain is present in the substernal region and lateral region. The pain is moderate. The quality of the pain is described as crushing and pressure. The pain radiates to the left shoulder. Associated symptoms include back pain. Pertinent negatives include no dizziness, exertional chest pressure, fever, lower extremity edema, nausea, palpitations or vomiting.     Review of Systems   Constitutional: Negative for fever.   Cardiovascular: Positive for chest pain. Negative for palpitations.   Gastrointestinal: Negative for nausea and vomiting.   Musculoskeletal: Positive for back pain.   Neurological: Negative for dizziness.      Objective:   /80 (BP Location: Left arm, Patient Position: Sitting, BP Cuff Size: Adult)   Pulse 82   Temp 36.9 °C (98.4 °F) (Temporal)   Resp 16   Ht 1.651 m (5' 5\")   Wt 57.9 kg (127 lb 9.6 oz)   SpO2 96%   BMI 21.23 kg/m²   Physical Exam   Constitutional: She is oriented to person, place, and time. She appears well-developed and well-nourished. No distress.   HENT:   Head: Normocephalic and atraumatic.   Eyes: Pupils are equal, round, and reactive to light. Conjunctivae and EOM are normal.   Cardiovascular: Normal rate and regular rhythm.    No murmur heard.  Pulmonary/Chest: Effort normal and breath sounds normal. No respiratory distress. She exhibits no tenderness and no swelling.   Abdominal: Soft. She exhibits no distension. There is no tenderness.   Neurological: She is alert and oriented to person, place, and time. She has normal reflexes. No " sensory deficit.   Skin: Skin is warm and dry.   Psychiatric: She has a normal mood and affect.        Assessment/Plan:   1. Chest pain, unspecified type  - EKG - Clinic Performed  - REFERRAL TO CARDIOLOGY  Use over-the-counter pain reliever, such as acetaminophen (Tylenol), ibuprofen (Advil, Motrin) or naproxen (Aleve) as needed; follow package directions for dosing.   Differential diagnosis, natural history, supportive care, and indications for immediate follow-up discussed.

## 2021-04-09 ENCOUNTER — TELEPHONE (OUTPATIENT)
Dept: SCHEDULING | Facility: IMAGING CENTER | Age: 41
End: 2021-04-09

## 2021-05-12 ENCOUNTER — HOSPITAL ENCOUNTER (OUTPATIENT)
Facility: MEDICAL CENTER | Age: 41
End: 2021-05-12
Attending: PHYSICIAN ASSISTANT
Payer: COMMERCIAL

## 2021-05-12 ENCOUNTER — OFFICE VISIT (OUTPATIENT)
Dept: URGENT CARE | Facility: CLINIC | Age: 41
End: 2021-05-12
Payer: COMMERCIAL

## 2021-05-12 VITALS
OXYGEN SATURATION: 98 % | DIASTOLIC BLOOD PRESSURE: 70 MMHG | SYSTOLIC BLOOD PRESSURE: 104 MMHG | HEIGHT: 66 IN | TEMPERATURE: 98.1 F | RESPIRATION RATE: 16 BRPM | BODY MASS INDEX: 20.73 KG/M2 | WEIGHT: 129 LBS | HEART RATE: 88 BPM

## 2021-05-12 DIAGNOSIS — N30.01 ACUTE CYSTITIS WITH HEMATURIA: Primary | ICD-10-CM

## 2021-05-12 LAB
APPEARANCE UR: NORMAL
BILIRUB UR STRIP-MCNC: NORMAL MG/DL
COLOR UR AUTO: NORMAL
GLUCOSE UR STRIP.AUTO-MCNC: 100 MG/DL
KETONES UR STRIP.AUTO-MCNC: NORMAL MG/DL
LEUKOCYTE ESTERASE UR QL STRIP.AUTO: NORMAL
NITRITE UR QL STRIP.AUTO: NORMAL
PH UR STRIP.AUTO: 6 [PH] (ref 5–8)
PROT UR QL STRIP: NORMAL MG/DL
RBC UR QL AUTO: NORMAL
SP GR UR STRIP.AUTO: 1.01
UROBILINOGEN UR STRIP-MCNC: 1 MG/DL

## 2021-05-12 PROCEDURE — 87086 URINE CULTURE/COLONY COUNT: CPT

## 2021-05-12 PROCEDURE — 99213 OFFICE O/P EST LOW 20 MIN: CPT | Performed by: PHYSICIAN ASSISTANT

## 2021-05-12 PROCEDURE — 87077 CULTURE AEROBIC IDENTIFY: CPT

## 2021-05-12 PROCEDURE — 81002 URINALYSIS NONAUTO W/O SCOPE: CPT | Performed by: PHYSICIAN ASSISTANT

## 2021-05-12 RX ORDER — NITROFURANTOIN 25; 75 MG/1; MG/1
100 CAPSULE ORAL EVERY 12 HOURS
Qty: 10 CAPSULE | Refills: 0 | Status: SHIPPED | OUTPATIENT
Start: 2021-05-12 | End: 2021-05-17

## 2021-05-12 ASSESSMENT — ENCOUNTER SYMPTOMS
NAUSEA: 1
FLANK PAIN: 0
VOMITING: 0

## 2021-05-12 NOTE — PROGRESS NOTES
Subjective:   Kimmie Elder is a 40 y.o. female who presents for Urinary Frequency (x yesterday, urinary frequency and burining with urination)        Dysuria   This is a new problem. The current episode started yesterday. The problem has been waxing and waning. The quality of the pain is described as burning. There has been no fever. She is sexually active. There is no history of pyelonephritis. Associated symptoms include frequency, nausea and urgency. Pertinent negatives include no discharge, flank pain, hesitancy or vomiting. She has tried increased fluids (urostat) for the symptoms. There is no history of kidney stones.     Review of Systems   Gastrointestinal: Positive for nausea. Negative for vomiting.   Genitourinary: Positive for dysuria, frequency and urgency. Negative for flank pain and hesitancy.       PMH:  has a past medical history of Acne vulgaris, Anxiety, Depression, and Psoriasis (age 16).  MEDS:   Current Outpatient Medications:   •  nitrofurantoin (MACROBID) 100 MG Cap, Take 1 capsule by mouth every 12 hours for 5 days., Disp: 10 capsule, Rfl: 0  •  citalopram (CELEXA) 10 MG tablet, TAKE 1 TABLET BY MOUTH EVERY DAY, Disp: 90 Tab, Rfl: 0  •  alprazolam (XANAX) 1 MG Tab, Take 1 mg by mouth at bedtime as needed for Sleep., Disp: , Rfl:   ALLERGIES:   Allergies   Allergen Reactions   • Pcn [Penicillins]      SURGHX:   Past Surgical History:   Procedure Laterality Date   • APPENDECTOMY  age 10     SOCHX:  reports that she has never smoked. She has never used smokeless tobacco. She reports current alcohol use of about 0.6 oz of alcohol per week. She reports that she does not use drugs.  Family History   Problem Relation Age of Onset   • Diabetes Maternal Grandfather    • Heart Disease Maternal Grandfather         heart attack   • Cancer Paternal Grandmother         colon   • Hypertension Mother    • Prostate cancer Father         not cancer   • Stroke Neg Hx         Objective:   /70 (BP  "Location: Left arm, Patient Position: Sitting, BP Cuff Size: Adult)   Pulse 88   Temp 36.7 °C (98.1 °F) (Temporal)   Resp 16   Ht 1.676 m (5' 6\")   Wt 58.5 kg (129 lb)   SpO2 98%   BMI 20.82 kg/m²     Physical Exam  Vitals reviewed.   Constitutional:       General: She is not in acute distress.     Appearance: She is well-developed.   HENT:      Head: Normocephalic and atraumatic.      Right Ear: External ear normal.      Left Ear: External ear normal.      Nose: Nose normal.      Mouth/Throat:      Mouth: Mucous membranes are moist.   Eyes:      Conjunctiva/sclera: Conjunctivae normal.      Pupils: Pupils are equal, round, and reactive to light.   Neck:      Trachea: No tracheal deviation.   Cardiovascular:      Rate and Rhythm: Normal rate and regular rhythm.   Pulmonary:      Effort: Pulmonary effort is normal.      Breath sounds: Normal breath sounds.   Abdominal:      General: There is no distension.      Tenderness: There is no abdominal tenderness. There is no right CVA tenderness or left CVA tenderness.   Musculoskeletal:      Cervical back: Normal range of motion and neck supple.   Skin:     General: Skin is warm and dry.      Capillary Refill: Capillary refill takes less than 2 seconds.   Neurological:      General: No focal deficit present.      Mental Status: She is alert and oriented to person, place, and time.   Psychiatric:         Mood and Affect: Mood normal.         Behavior: Behavior normal.           Assessment/Plan:     1. Acute cystitis with hematuria  POCT Urinalysis    Urine Culture    nitrofurantoin (MACROBID) 100 MG Cap     UA: pt taking urostat    hCG: Tubal ligation    Supportive care reviewed. Continue urostat, increase fluids and start macrobid.  She will be notified of final urine culture results or not susceptible to Macrobid prescribed at today's visit.  UTI handout provided.    If symptoms worsen or persist patient can return to clinic for reevaluation.  Side effects of " medication discussed. Patient confirmed understanding of information.    Please note that this dictation was created using voice recognition software. I have made every reasonable attempt to correct obvious errors, but I expect that there are errors of grammar and possibly content that I did not discover before finalizing the note.

## 2021-05-13 DIAGNOSIS — N30.01 ACUTE CYSTITIS WITH HEMATURIA: ICD-10-CM

## 2021-05-15 LAB
BACTERIA UR CULT: NORMAL
SIGNIFICANT IND 70042: NORMAL
SITE SITE: NORMAL
SOURCE SOURCE: NORMAL

## 2021-07-01 ENCOUNTER — OFFICE VISIT (OUTPATIENT)
Dept: MEDICAL GROUP | Facility: PHYSICIAN GROUP | Age: 41
End: 2021-07-01
Payer: COMMERCIAL

## 2021-07-01 VITALS
SYSTOLIC BLOOD PRESSURE: 108 MMHG | HEART RATE: 80 BPM | OXYGEN SATURATION: 98 % | HEIGHT: 66 IN | DIASTOLIC BLOOD PRESSURE: 80 MMHG | WEIGHT: 127.2 LBS | BODY MASS INDEX: 20.44 KG/M2 | TEMPERATURE: 99.3 F

## 2021-07-01 DIAGNOSIS — Z12.31 ENCOUNTER FOR SCREENING MAMMOGRAM FOR MALIGNANT NEOPLASM OF BREAST: ICD-10-CM

## 2021-07-01 DIAGNOSIS — F41.9 ANXIETY: ICD-10-CM

## 2021-07-01 DIAGNOSIS — Z00.00 WELL WOMAN EXAM (NO GYNECOLOGICAL EXAM): ICD-10-CM

## 2021-07-01 DIAGNOSIS — L70.0 ACNE CYSTICA: ICD-10-CM

## 2021-07-01 PROCEDURE — 99214 OFFICE O/P EST MOD 30 MIN: CPT | Performed by: FAMILY MEDICINE

## 2021-07-01 RX ORDER — TRETINOIN 0.5 MG/G
CREAM TOPICAL NIGHTLY
COMMUNITY
End: 2021-07-01 | Stop reason: SDUPTHER

## 2021-07-01 RX ORDER — TRETINOIN 0.5 MG/G
CREAM TOPICAL
Qty: 45 G | Refills: 3 | Status: SHIPPED | OUTPATIENT
Start: 2021-07-01 | End: 2021-12-01

## 2021-07-01 ASSESSMENT — PATIENT HEALTH QUESTIONNAIRE - PHQ9
2. FEELING DOWN, DEPRESSED, IRRITABLE, OR HOPELESS: NOT AT ALL
1. LITTLE INTEREST OR PLEASURE IN DOING THINGS: NOT AT ALL
SUM OF ALL RESPONSES TO PHQ9 QUESTIONS 1 AND 2: 0

## 2021-07-01 NOTE — ASSESSMENT & PLAN NOTE
Chronic issue  The patient has had a hx of cystic acne for a long time  She is on Retin-A 0.05 nightly which helps

## 2021-07-01 NOTE — PROGRESS NOTES
"Subjective:     Chief Complaint   Patient presents with   • Establish Care     prior pcp ayanna cesar   • Medication Management       HPI:   Kimmie Degroot presents today to discuss the following.    Acne cystica  Chronic issue  The patient has had a hx of cystic acne for a long time  She is on Retin-A 0.05 nightly which helps      Anxiety  Chronic issue  On xanax PRN and celexa which follows up with psychiatry for.   All stable.       Past Medical History:   Diagnosis Date   • Acne vulgaris    • Anxiety    • Depression    • Psoriasis age 16       Current Outpatient Medications Ordered in Epic   Medication Sig Dispense Refill   • tretinoin (RETIN-A) 0.05 % cream Apply topically to affected areas nightly 45 g 3   • citalopram (CELEXA) 10 MG tablet TAKE 1 TABLET BY MOUTH EVERY DAY 90 Tab 0   • alprazolam (XANAX) 1 MG Tab Take 1 mg by mouth at bedtime as needed for Sleep.       No current Epic-ordered facility-administered medications on file.       Allergies:  Pcn [penicillins]    Health Maintenance: Completed    ROS:  Gen: no fevers/chills, no changes in weight  Eyes: no changes in vision  Pulm: no sob, no cough  CV: no chest pain, no palpitations  GI: no nausea/vomiting, no diarrhea      Objective:     Exam:  /80 (BP Location: Right arm, Patient Position: Sitting, BP Cuff Size: Adult)   Pulse 80   Temp 37.4 °C (99.3 °F) (Temporal)   Ht 1.676 m (5' 6\")   Wt 57.7 kg (127 lb 3.2 oz)   SpO2 98%   BMI 20.53 kg/m²  Body mass index is 20.53 kg/m².          Constitutional: Alert, no distress, well-groomed.  Skin: Warm, dry, good turgor, no rashes in visible areas.  Eye: Equal, round and reactive, conjunctiva clear, lids normal.  ENMT: Lips without lesions, good dentition, moist mucous membranes.  Neck: Trachea midline, no masses, no thyromegaly.  Respiratory: Unlabored respiratory effort, no cough.  MSK: Normal gait, moves all extremities.  Neuro: Grossly non-focal.   Psych: Alert and oriented x3, normal " affect and mood.        Assessment & Plan:     40 y.o. female with the following -     1. Acne cystica  Chronic, stable condition.  The patient is on Retin-A which helps her keep her acne under control.  She is requesting a refill for this today.  - tretinoin (RETIN-A) 0.05 % cream; Apply topically to affected areas nightly  Dispense: 45 g; Refill: 3    2. Anxiety  Chronic, stable condition.  Patient has a history of anxiety managed by psychiatry.  She is on Celexa.  She only takes Xanax as needed.  -Continue to follow-up with psychiatry    3. Well woman exam (no gynecological exam)  - CBC WITHOUT DIFFERENTIAL; Future  - Basic Metabolic Panel; Future  - HEMOGLOBIN A1C; Future  - Lipid Profile; Future    4. Encounter for screening mammogram for malignant neoplasm of breast  - MA-SCREENING MAMMO BILAT W/TOMOSYNTHESIS W/CAD; Future      Return in about 6 months (around 1/1/2022).    Please note that this dictation was created using voice recognition software. I have made every reasonable attempt to correct obvious errors, but I expect that there are errors of grammar and possibly content that I did not discover before finalizing the note.

## 2021-07-14 ENCOUNTER — OFFICE VISIT (OUTPATIENT)
Dept: URGENT CARE | Facility: CLINIC | Age: 41
End: 2021-07-14
Payer: COMMERCIAL

## 2021-07-14 ENCOUNTER — HOSPITAL ENCOUNTER (OUTPATIENT)
Facility: MEDICAL CENTER | Age: 41
End: 2021-07-14
Attending: NURSE PRACTITIONER
Payer: COMMERCIAL

## 2021-07-14 VITALS
HEART RATE: 78 BPM | WEIGHT: 127 LBS | RESPIRATION RATE: 16 BRPM | SYSTOLIC BLOOD PRESSURE: 100 MMHG | TEMPERATURE: 98 F | HEIGHT: 61 IN | DIASTOLIC BLOOD PRESSURE: 64 MMHG | BODY MASS INDEX: 23.98 KG/M2 | OXYGEN SATURATION: 96 %

## 2021-07-14 DIAGNOSIS — R30.0 DYSURIA: ICD-10-CM

## 2021-07-14 DIAGNOSIS — N30.00 ACUTE CYSTITIS WITHOUT HEMATURIA: ICD-10-CM

## 2021-07-14 LAB
APPEARANCE UR: NORMAL
BILIRUB UR STRIP-MCNC: NORMAL MG/DL
COLOR UR AUTO: NORMAL
GLUCOSE UR STRIP.AUTO-MCNC: 250 MG/DL
KETONES UR STRIP.AUTO-MCNC: 40 MG/DL
LEUKOCYTE ESTERASE UR QL STRIP.AUTO: NORMAL
NITRITE UR QL STRIP.AUTO: POSITIVE
PH UR STRIP.AUTO: 5 [PH] (ref 5–8)
PROT UR QL STRIP: 300 MG/DL
RBC UR QL AUTO: NORMAL
SP GR UR STRIP.AUTO: 1
UROBILINOGEN UR STRIP-MCNC: 8 MG/DL

## 2021-07-14 PROCEDURE — 87086 URINE CULTURE/COLONY COUNT: CPT

## 2021-07-14 PROCEDURE — 81002 URINALYSIS NONAUTO W/O SCOPE: CPT | Performed by: NURSE PRACTITIONER

## 2021-07-14 PROCEDURE — 99213 OFFICE O/P EST LOW 20 MIN: CPT | Performed by: NURSE PRACTITIONER

## 2021-07-14 RX ORDER — CEFDINIR 300 MG/1
300 CAPSULE ORAL EVERY 12 HOURS
Qty: 10 CAPSULE | Refills: 0 | Status: SHIPPED | OUTPATIENT
Start: 2021-07-14 | End: 2021-07-19

## 2021-07-14 ASSESSMENT — ENCOUNTER SYMPTOMS
ABDOMINAL PAIN: 0
FEVER: 0
FLANK PAIN: 0
VOMITING: 0
DIZZINESS: 0
NAUSEA: 0
HEADACHES: 0
CHILLS: 0

## 2021-07-15 DIAGNOSIS — N30.00 ACUTE CYSTITIS WITHOUT HEMATURIA: ICD-10-CM

## 2021-07-16 ENCOUNTER — HOSPITAL ENCOUNTER (OUTPATIENT)
Dept: RADIOLOGY | Facility: MEDICAL CENTER | Age: 41
End: 2021-07-16
Payer: COMMERCIAL

## 2021-07-16 ENCOUNTER — TELEPHONE (OUTPATIENT)
Dept: MEDICAL GROUP | Facility: PHYSICIAN GROUP | Age: 41
End: 2021-07-16

## 2021-07-16 RX ORDER — NITROFURANTOIN 25; 75 MG/1; MG/1
100 CAPSULE ORAL 2 TIMES DAILY
Qty: 10 CAPSULE | Refills: 0 | Status: SHIPPED | OUTPATIENT
Start: 2021-07-16 | End: 2021-07-21

## 2021-07-16 NOTE — TELEPHONE ENCOUNTER
VOICEMAIL  1. Caller Name: Kimmie Mikayla Sampson                        Call Back Number: [phone] (home)       2. Message: patient lvm stating that she was prescribed cefdinir from the urgent care at [facility], however she is getting an upset stomach from it she has stopped and is hoping you can send her in a different medication for UTI to the [facility] in [place]. She is in pain and is having burning sensation when she urinates.     3. Patient approves office to leave a detailed voicemail/MyChart message: yes

## 2021-07-16 NOTE — TELEPHONE ENCOUNTER
VOICEMAIL  1. Caller Name: Kimmie Mikayla Sampson                        Call Back Number: [phone] (home)       2. Message:  patient lvm stating that she was prescribed cefdinir from the urgent care at [facility], however she is getting an upset stomach from it she has stopped and is hoping you can send her in a different medication for UTI to the [facility] in [place]. She is in pain and is having burning sensation when she urinates.     3. Patient approves office to leave a detailed voicemail/MyChart message: yes

## 2021-07-17 LAB
BACTERIA UR CULT: NORMAL
SIGNIFICANT IND 70042: NORMAL
SITE SITE: NORMAL
SOURCE SOURCE: NORMAL

## 2021-07-27 NOTE — PROGRESS NOTES
Chief Complaint   Patient presents with   • Follow-Up     labs        HISTORY OF PRESENT ILLNESS: Patient is a 37 y.o. female established patient who presents today toDiscuss anxiety, lab results.    Hot flashes  States that she will notice hot flashes. States that these are happening 1-2 times per week. States that she becomes extremely hot and sweaty and cold.  States she will become suddenly hot and sweaty states that she is unsure if these are stress related. States she is having increased stress with a 10 year old with ADHD who is unmedicated related to side effects of the medications.     Anxiety  Chronic in nature. Worsening. Patient is followed by psychiatry. Patient does take Xanax intermittently. States that she has been having a lot of increasing stress with her son's ADHD, school meetings, work. States that in the past she was on Celexa for depression and states that she was unaware this would help with anxiety. Patient did not have side effects from this medication states that she felt a lot better while taking it. Patient states that she is concerned that some of her hair loss could be related to stress.    Hair loss  She continues to have hair loss thyroid labs are within normal limits counseled patient that this could possibly be related to increased stress or could also be related to hormone changes discussed with patient that since she is having hot flashes as well as hair loss it would be wise to check some hormone levels these are ordered at this time. Patient states that her last continues to be significant and she is fearful of becoming bald.      Patient Active Problem List    Diagnosis Date Noted   • Hot flashes 10/10/2017   • Hair loss 10/03/2017   • Anxiety 09/08/2017   • Recurrent major depressive disorder, in full remission (CMS-[facility]) 09/08/2017   • Psoriasis        Allergies:Pcn [penicillins]    Current Outpatient Prescriptions   Medication Sig Dispense Refill   • citalopram (CELEXA) 10  MG tablet Take 1 Tab by mouth every day. 30 Tab 0   • albuterol 108 (90 Base) MCG/ACT Aero Soln inhalation aerosol Inhale 2 Puffs by mouth every four hours as needed for Shortness of Breath. 1 Inhaler 0   • alprazolam (XANAX) 1 MG Tab Take 1 mg by mouth at bedtime as needed for Sleep.     • Ibuprofen (ADVIL PO) Take  by mouth.     • Acetaminophen (TYLENOL PO) Take  by mouth.       No current facility-administered medications for this visit.        Social History   Substance Use Topics   • Smoking status: Never Smoker   • Smokeless tobacco: Never Used   • Alcohol use 0.6 oz/week     1 Cans of beer per week      Comment: occ       Family Status   Relation Status   • Maternal Grandfather    • Paternal Grandmother    • Mother    • Father Alive   • Neg Hx      Family History   Problem Relation Age of Onset   • Diabetes Maternal Grandfather    • Heart Disease Maternal Grandfather      heart attack   • Cancer Paternal Grandmother      colon   • Hypertension Mother    • Prostate cancer Father      not cancer   • Stroke Neg Hx        Review of Systems:   Constitutional:  Negative for fever, chills, weight loss and malaise/fatigue.   HEENT:  Negative for ear pain, nosebleeds, congestion, sore throat and neck pain.    Eyes:  Negative for blurred vision.   Respiratory:  Negative for cough, sputum production, shortness of breath and wheezing.    Cardiovascular:  Negative for chest pain, palpitations, orthopnea and leg swelling.   Gastrointestinal:  Negative for heartburn, nausea, vomiting and abdominal pain.   Genitourinary:  Negative for dysuria, urgency and frequency.   Musculoskeletal:  Negative for myalgias, back pain and joint pain.   Skin:  Negative for rash and itching.   Neurological:  Negative for dizziness, tingling, tremors, sensory change, focal weakness and headaches.   Endo/Heme/Allergies:  Does not bruise/bleed easily.   Psychiatric/Behavioral:  Negative for depression, suicidal ideas and memory loss.  The  "patient is nervous/anxious and does not have insomnia.    All other systems reviewed and are negative except as in HPI.    Exam:  Blood pressure 110/64, pulse 77, temperature 37.1 °C (98.7 °F), resp. rate 14, height 1.651 m (5' 5\"), weight 60.8 kg (134 lb), last menstrual period 09/18/2017, SpO2 99 %, not currently breastfeeding.  General:  Normal appearing. No distress.  Pulmonary:  Clear to ausculation.  Normal effort. No rales, ronchi, or wheezing.  Cardiovascular:  Regular rate and rhythm without murmur. Carotid and radial pulses are intact and equal bilaterally.  Abdomen:  Soft, nontender, nondistended. Normal bowel sounds. Liver and spleen are not palpable  Neurologic:  Grossly nonfocal  Lymph:  No cervical, supraclavicular or axillary lymph nodes are palpable  Skin:  Warm and dry.  No obvious lesions. No bald are noted on patient's scalp or throughout her hair.  Musculoskeletal:  Normal gait. No extremity cyanosis, clubbing, or edema.  Psych:  Normal mood and affect. Alert and oriented x3. Judgment and insight is normal.      PLAN:    1. Hot flashes  Plan to assess if further treatment for depression and anxiety do not improve symptoms.  - TESTOSTERONE, FREE AND TOTAL  - FSH+LH+DHEA S+PROG_E1+E2    2. Recurrent major depressive disorder, in full remission (CMS-HCC)  Restart Celexa.  - REFERRAL TO PSYCHOLOGY  - citalopram (CELEXA) 10 MG tablet; Take 1 Tab by mouth every day.  Dispense: 30 Tab; Refill: 0    3. Anxiety  Restart Celexa follow with psychology.  - REFERRAL TO PSYCHOLOGY  - citalopram (CELEXA) 10 MG tablet; Take 1 Tab by mouth every day.  Dispense: 30 Tab; Refill: 0    4. Hair loss  Plan to check hormone levels dependent on success with treatment for anxiety.    Discussed with a patient hair loss. Multifaceted and that it could be related to increased stress in her life. Patient does wish to treat anxiety, depression symptoms prior to completing further testing regarding hormones, etc. Patient will " follow-up in one month or sooner if needed. Patient is encouraged to be seen in the emergency room for chest pain, palpitations, shortness of breath, dizziness, severe abdominal pain or other concerning symptoms.        Please note that this dictation was created using voice recognition software. I have made every reasonable attempt to correct obvious errors, but I expect that there are errors of grammar and possibly content that I did not discover before finalizing the note.    Assessment/Plan:  1. Hot flashes  TESTOSTERONE, FREE AND TOTAL    FSH+LH+DHEA S+PROG_E1+E2   2. Recurrent major depressive disorder, in full remission (CMS-HCC)  REFERRAL TO PSYCHOLOGY    citalopram (CELEXA) 10 MG tablet   3. Anxiety  REFERRAL TO PSYCHOLOGY    citalopram (CELEXA) 10 MG tablet   4. Hair loss            I have placed the below orders and discussed them with an approved delegating provider. The MA is performing the below orders under the direction of Dr. Baldwin.   Female

## 2021-08-11 ENCOUNTER — TELEPHONE (OUTPATIENT)
Dept: MEDICAL GROUP | Facility: PHYSICIAN GROUP | Age: 41
End: 2021-08-11

## 2021-08-11 NOTE — TELEPHONE ENCOUNTER
DOCUMENTATION OF PAR STATUS:    1. Name of Medication & Dose: Tretinoin Cream 0.05%    2. Name of Prescription Coverage Company & phone #: [employer]    3. Date Prior Auth Submitted: 8/11/21    4. What information was given to obtain insurance decision?  OV note from 7/1/21    5. Prior Auth Status? Pending    6. Patient Notified: no    PA completed via covermymeds & scanned to Media.

## 2021-08-26 ENCOUNTER — APPOINTMENT (OUTPATIENT)
Dept: RADIOLOGY | Facility: MEDICAL CENTER | Age: 41
End: 2021-08-26
Attending: FAMILY MEDICINE
Payer: COMMERCIAL

## 2021-11-30 DIAGNOSIS — L70.0 ACNE CYSTICA: ICD-10-CM

## 2021-12-01 RX ORDER — TRETINOIN 0.5 MG/G
CREAM TOPICAL
Qty: 45 G | Refills: 0 | Status: SHIPPED | OUTPATIENT
Start: 2021-12-01 | End: 2021-12-30

## 2021-12-30 DIAGNOSIS — L70.0 ACNE CYSTICA: ICD-10-CM

## 2021-12-30 RX ORDER — TRETINOIN 0.5 MG/G
CREAM TOPICAL
Qty: 45 G | Refills: 0 | Status: SHIPPED | OUTPATIENT
Start: 2021-12-30 | End: 2022-01-31

## 2022-01-06 ENCOUNTER — APPOINTMENT (OUTPATIENT)
Dept: MEDICAL GROUP | Facility: PHYSICIAN GROUP | Age: 42
End: 2022-01-06
Payer: COMMERCIAL

## 2022-01-30 DIAGNOSIS — L70.0 ACNE CYSTICA: ICD-10-CM

## 2022-01-31 RX ORDER — TRETINOIN 0.5 MG/G
CREAM TOPICAL
Qty: 45 G | Refills: 0 | Status: SHIPPED | OUTPATIENT
Start: 2022-01-31

## 2022-03-11 ENCOUNTER — OFFICE VISIT (OUTPATIENT)
Dept: URGENT CARE | Facility: CLINIC | Age: 42
End: 2022-03-11
Payer: COMMERCIAL

## 2022-03-11 VITALS
HEART RATE: 68 BPM | HEIGHT: 66 IN | TEMPERATURE: 97.8 F | OXYGEN SATURATION: 97 % | SYSTOLIC BLOOD PRESSURE: 106 MMHG | DIASTOLIC BLOOD PRESSURE: 66 MMHG | BODY MASS INDEX: 20.89 KG/M2 | WEIGHT: 130 LBS | RESPIRATION RATE: 14 BRPM

## 2022-03-11 DIAGNOSIS — N30.01 ACUTE CYSTITIS WITH HEMATURIA: ICD-10-CM

## 2022-03-11 DIAGNOSIS — R30.0 DYSURIA: ICD-10-CM

## 2022-03-11 LAB
APPEARANCE UR: NORMAL
BILIRUB UR STRIP-MCNC: NEGATIVE MG/DL
COLOR UR AUTO: NORMAL
GLUCOSE UR STRIP.AUTO-MCNC: NORMAL MG/DL
INT CON NEG: NORMAL
INT CON POS: NORMAL
KETONES UR STRIP.AUTO-MCNC: NEGATIVE MG/DL
LEUKOCYTE ESTERASE UR QL STRIP.AUTO: NORMAL
NITRITE UR QL STRIP.AUTO: POSITIVE
PH UR STRIP.AUTO: 6 [PH] (ref 5–8)
POC URINE PREGNANCY TEST: NEGATIVE
PROT UR QL STRIP: NEGATIVE MG/DL
RBC UR QL AUTO: NORMAL
SP GR UR STRIP.AUTO: 1.01
UROBILINOGEN UR STRIP-MCNC: 1 MG/DL

## 2022-03-11 PROCEDURE — 81002 URINALYSIS NONAUTO W/O SCOPE: CPT | Performed by: PHYSICIAN ASSISTANT

## 2022-03-11 PROCEDURE — 81025 URINE PREGNANCY TEST: CPT | Performed by: PHYSICIAN ASSISTANT

## 2022-03-11 PROCEDURE — 99214 OFFICE O/P EST MOD 30 MIN: CPT | Performed by: PHYSICIAN ASSISTANT

## 2022-03-11 RX ORDER — NITROFURANTOIN 25; 75 MG/1; MG/1
100 CAPSULE ORAL EVERY 12 HOURS
Qty: 10 CAPSULE | Refills: 0 | Status: SHIPPED | OUTPATIENT
Start: 2022-03-11 | End: 2022-03-16

## 2022-03-11 ASSESSMENT — ENCOUNTER SYMPTOMS
FEVER: 0
FLANK PAIN: 0
CHILLS: 0

## 2022-03-11 NOTE — PROGRESS NOTES
"  Subjective:   Kimmie Elder is a 41 y.o. female who presents today with   Chief Complaint   Patient presents with   • UTI     Began overnight, burning     UTI  This is a new problem. The current episode started yesterday. The problem occurs constantly. The problem has been unchanged. Pertinent negatives include no chills or fever. Treatments tried: AZO. The treatment provided mild relief.     Patient was last seen for UTI in urgent care in July.  She did have a urology referral placed at that time but never saw the urologist as UTI recurrence seemed to improve.  PMH:  has a past medical history of Acne vulgaris, Anxiety, Depression, and Psoriasis (age 16).  MEDS:   Current Outpatient Medications:   •  nitrofurantoin (MACROBID) 100 MG Cap, Take 1 Capsule by mouth every 12 hours for 5 days., Disp: 10 Capsule, Rfl: 0  •  ALPRAZolam (XANAX) 1 MG Tab, Take 1 mg by mouth at bedtime as needed for Sleep., Disp: , Rfl:   •  tretinoin (RETIN-A) 0.05 % cream, APPLY TOPICALLY TO AFFECTED AREAS NIGHTLY (Patient not taking: Reported on 3/11/2022), Disp: 45 g, Rfl: 0  ALLERGIES:   Allergies   Allergen Reactions   • Pcn [Penicillins]      SURGHX:   Past Surgical History:   Procedure Laterality Date   • APPENDECTOMY  age 10     SOCHX:  reports that she has been smoking cigarettes. She has never used smokeless tobacco. She reports previous alcohol use of about 0.6 oz of alcohol per week. She reports that she does not use drugs.  FH: Reviewed with patient, not pertinent to this visit.     Review of Systems   Constitutional: Negative for chills and fever.   Genitourinary: Positive for dysuria, frequency and urgency. Negative for flank pain and hematuria.      Objective:   /66 (BP Location: Left arm, Patient Position: Sitting, BP Cuff Size: Adult)   Pulse 68   Temp 36.6 °C (97.8 °F) (Temporal)   Resp 14   Ht 1.676 m (5' 6\")   Wt 59 kg (130 lb)   LMP 03/01/2022 (Approximate)   SpO2 97%   BMI 20.98 kg/m²   Physical " Exam  Vitals and nursing note reviewed.   Constitutional:       General: She is not in acute distress.     Appearance: Normal appearance. She is well-developed. She is not ill-appearing or toxic-appearing.   HENT:      Head: Normocephalic and atraumatic.      Right Ear: Hearing normal.      Left Ear: Hearing normal.   Cardiovascular:      Rate and Rhythm: Normal rate and regular rhythm.      Heart sounds: Normal heart sounds.   Pulmonary:      Effort: Pulmonary effort is normal.      Breath sounds: Normal breath sounds.   Abdominal:      Tenderness: There is abdominal tenderness in the suprapubic area. There is no right CVA tenderness or left CVA tenderness.   Genitourinary:     Comments: Patient deferred  exam  Musculoskeletal:      Comments: Normal movement in all 4 extremities   Skin:     General: Skin is warm and dry.   Neurological:      Mental Status: She is alert.      Coordination: Coordination normal.   Psychiatric:         Mood and Affect: Mood normal.       UA consistent with UTI patient is also taking Azo.  Pregnancy negative    Assessment/Plan:   Assessment    1. Acute cystitis with hematuria  - nitrofurantoin (MACROBID) 100 MG Cap; Take 1 Capsule by mouth every 12 hours for 5 days.  Dispense: 10 Capsule; Refill: 0    2. Dysuria  - POCT Urinalysis  - POCT Pregnancy  Symptoms and presentation consistent with UTI at this time will treat accordingly with antibiotics.  Differential diagnosis, natural history, supportive care, and indications for immediate follow-up discussed.   Patient given instructions and understanding of medications and treatment.    If not improving in 3-5 days, F/U with PCP or return to UC if symptoms worsen.    Patient agreeable to plan.      Please note that this dictation was created using voice recognition software. I have made every reasonable attempt to correct obvious errors, but I expect that there are errors of grammar and possibly content that I did not discover before  finalizing the note.    Alexander Carrizales PA-C

## 2022-06-27 ENCOUNTER — OFFICE VISIT (OUTPATIENT)
Dept: MEDICAL GROUP | Facility: PHYSICIAN GROUP | Age: 42
End: 2022-06-27
Payer: COMMERCIAL

## 2022-06-27 ENCOUNTER — HOSPITAL ENCOUNTER (OUTPATIENT)
Dept: LAB | Facility: MEDICAL CENTER | Age: 42
End: 2022-06-27
Attending: FAMILY MEDICINE
Payer: COMMERCIAL

## 2022-06-27 VITALS
BODY MASS INDEX: 21.99 KG/M2 | HEIGHT: 65 IN | RESPIRATION RATE: 16 BRPM | HEART RATE: 77 BPM | SYSTOLIC BLOOD PRESSURE: 102 MMHG | TEMPERATURE: 98.8 F | DIASTOLIC BLOOD PRESSURE: 68 MMHG | OXYGEN SATURATION: 95 % | WEIGHT: 132 LBS

## 2022-06-27 DIAGNOSIS — Z00.00 WELL ADULT EXAM: ICD-10-CM

## 2022-06-27 DIAGNOSIS — L70.0 ACNE CYSTICA: ICD-10-CM

## 2022-06-27 DIAGNOSIS — R53.83 OTHER FATIGUE: ICD-10-CM

## 2022-06-27 LAB
ALBUMIN SERPL BCP-MCNC: 4.7 G/DL (ref 3.2–4.9)
ALBUMIN/GLOB SERPL: 2.8 G/DL
ALP SERPL-CCNC: 41 U/L (ref 30–99)
ALT SERPL-CCNC: 13 U/L (ref 2–50)
ANION GAP SERPL CALC-SCNC: 11 MMOL/L (ref 7–16)
AST SERPL-CCNC: 15 U/L (ref 12–45)
BILIRUB SERPL-MCNC: 0.4 MG/DL (ref 0.1–1.5)
BUN SERPL-MCNC: 9 MG/DL (ref 8–22)
CALCIUM SERPL-MCNC: 8.7 MG/DL (ref 8.5–10.5)
CHLORIDE SERPL-SCNC: 105 MMOL/L (ref 96–112)
CHOLEST SERPL-MCNC: 191 MG/DL (ref 100–199)
CO2 SERPL-SCNC: 22 MMOL/L (ref 20–33)
CREAT SERPL-MCNC: 0.69 MG/DL (ref 0.5–1.4)
ERYTHROCYTE [DISTWIDTH] IN BLOOD BY AUTOMATED COUNT: 43.8 FL (ref 35.9–50)
EST. AVERAGE GLUCOSE BLD GHB EST-MCNC: 94 MG/DL
FASTING STATUS PATIENT QL REPORTED: NORMAL
GFR SERPLBLD CREATININE-BSD FMLA CKD-EPI: 110 ML/MIN/1.73 M 2
GLOBULIN SER CALC-MCNC: 1.7 G/DL (ref 1.9–3.5)
GLUCOSE SERPL-MCNC: 85 MG/DL (ref 65–99)
HBA1C MFR BLD: 4.9 % (ref 4–5.6)
HCT VFR BLD AUTO: 38.3 % (ref 37–47)
HDLC SERPL-MCNC: 83 MG/DL
HGB BLD-MCNC: 13.3 G/DL (ref 12–16)
LDLC SERPL CALC-MCNC: 97 MG/DL
MCH RBC QN AUTO: 31.4 PG (ref 27–33)
MCHC RBC AUTO-ENTMCNC: 34.7 G/DL (ref 33.6–35)
MCV RBC AUTO: 90.3 FL (ref 81.4–97.8)
PLATELET # BLD AUTO: 188 K/UL (ref 164–446)
PMV BLD AUTO: 12.4 FL (ref 9–12.9)
POTASSIUM SERPL-SCNC: 3.7 MMOL/L (ref 3.6–5.5)
PROT SERPL-MCNC: 6.4 G/DL (ref 6–8.2)
RBC # BLD AUTO: 4.24 M/UL (ref 4.2–5.4)
SODIUM SERPL-SCNC: 138 MMOL/L (ref 135–145)
TRIGL SERPL-MCNC: 57 MG/DL (ref 0–149)
WBC # BLD AUTO: 5.7 K/UL (ref 4.8–10.8)

## 2022-06-27 PROCEDURE — 80053 COMPREHEN METABOLIC PANEL: CPT

## 2022-06-27 PROCEDURE — 80061 LIPID PANEL: CPT

## 2022-06-27 PROCEDURE — 85027 COMPLETE CBC AUTOMATED: CPT

## 2022-06-27 PROCEDURE — 36415 COLL VENOUS BLD VENIPUNCTURE: CPT

## 2022-06-27 PROCEDURE — 99214 OFFICE O/P EST MOD 30 MIN: CPT | Performed by: FAMILY MEDICINE

## 2022-06-27 PROCEDURE — 83036 HEMOGLOBIN GLYCOSYLATED A1C: CPT

## 2022-06-27 RX ORDER — CITALOPRAM 40 MG/1
TABLET ORAL
COMMUNITY
Start: 2022-05-01

## 2022-06-27 NOTE — PROGRESS NOTES
"Subjective:     Chief Complaint   Patient presents with   • Fatigue     After meals x 9 months   • Acne     Chronic several years       HPI:   Kimmie presents today to discuss the following.    Other fatigue  Chronic issue  Feeling it over the past 9mo  Especially after eating, heavy in nature      Acne cystica  Chronic issue  Follows up with dermatology   On RetinA       Past Medical History:   Diagnosis Date   • Acne vulgaris    • Anxiety    • Depression    • Psoriasis age 16       Current Outpatient Medications Ordered in Epic   Medication Sig Dispense Refill   • citalopram (CELEXA) 40 MG Tab TAKE HALF OF A TABLET (20 MG) BY MOUTH DAILY     • tretinoin (RETIN-A) 0.05 % cream APPLY TOPICALLY TO AFFECTED AREAS NIGHTLY 45 g 0   • ALPRAZolam (XANAX) 1 MG Tab Take 1 mg by mouth at bedtime as needed for Sleep.       No current Epic-ordered facility-administered medications on file.       Allergies:  Pcn [penicillins]    Health Maintenance: Completed    ROS:  Gen: no fevers/chills, no changes in weight  Eyes: no changes in vision  Pulm: no sob, no cough  CV: no chest pain, no palpitations  GI: no nausea/vomiting, no diarrhea      Objective:     Exam:  /68 (BP Location: Left arm, Patient Position: Sitting, BP Cuff Size: Adult)   Pulse 77   Temp 37.1 °C (98.8 °F)   Resp 16   Ht 1.651 m (5' 5\")   Wt 59.9 kg (132 lb)   SpO2 95%   BMI 21.97 kg/m²  Body mass index is 21.97 kg/m².      Constitutional: Alert, no distress, well-groomed.  Skin: Warm, dry, good turgor, no rashes in visible areas.  Eye: Equal, round and reactive, conjunctiva clear, lids normal.  ENMT: Lips without lesions, good dentition, moist mucous membranes.  Neck: Trachea midline, no masses, no thyromegaly.  Respiratory: Unlabored respiratory effort, no cough.  MSK: Normal gait, moves all extremities.  Neuro: Grossly non-focal.   Psych: Alert and oriented x3, normal affect and mood.        Assessment & Plan:     41 y.o. female with the following - "     1. Other fatigue  Chronic issue, will establish baseline labs and reassess    2. Well adult exam    - CBC WITHOUT DIFFERENTIAL; Future  - Comp Metabolic Panel; Future  - HEMOGLOBIN A1C; Future  - Lipid Profile; Future    3. Acne cystica  Chronic issue. Recommend discussing with dermatology       Return in about 3 months (around 9/27/2022).    Please note that this dictation was created using voice recognition software. I have made every reasonable attempt to correct obvious errors, but I expect that there are errors of grammar and possibly content that I did not discover before finalizing the note.

## 2022-07-25 ENCOUNTER — APPOINTMENT (RX ONLY)
Dept: URBAN - METROPOLITAN AREA CLINIC 6 | Facility: CLINIC | Age: 42
Setting detail: DERMATOLOGY
End: 2022-07-25

## 2022-07-25 DIAGNOSIS — L70.8 OTHER ACNE: ICD-10-CM

## 2022-07-25 PROCEDURE — ? PHOTO-DOCUMENTATION

## 2022-07-25 PROCEDURE — ? DIAGNOSIS COMMENT

## 2022-07-25 PROCEDURE — ? TREATMENT REGIMEN

## 2022-07-25 PROCEDURE — ? PRESCRIPTION

## 2022-07-25 PROCEDURE — ? COUNSELING

## 2022-07-25 PROCEDURE — 99203 OFFICE O/P NEW LOW 30 MIN: CPT

## 2022-07-25 RX ORDER — TRETIONIN 1 MG/G
1 CREAM TOPICAL QHS
Qty: 45 | Refills: 3 | Status: ERX | COMMUNITY
Start: 2022-07-25

## 2022-07-25 RX ORDER — SPIRONOLACTONE 50 MG/1
1 TABLET, FILM COATED ORAL QD
Qty: 30 | Refills: 2 | Status: ERX | COMMUNITY
Start: 2022-07-25

## 2022-07-25 RX ADMIN — SPIRONOLACTONE 1: 50 TABLET, FILM COATED ORAL at 00:00

## 2022-07-25 RX ADMIN — TRETIONIN 1: 1 CREAM TOPICAL at 00:00

## 2022-07-25 ASSESSMENT — LOCATION DETAILED DESCRIPTION DERM
LOCATION DETAILED: RIGHT INFERIOR CENTRAL MALAR CHEEK
LOCATION DETAILED: LEFT INFERIOR CENTRAL MALAR CHEEK

## 2022-07-25 ASSESSMENT — LOCATION SIMPLE DESCRIPTION DERM
LOCATION SIMPLE: LEFT CHEEK
LOCATION SIMPLE: RIGHT CHEEK

## 2022-07-25 ASSESSMENT — LOCATION ZONE DERM: LOCATION ZONE: FACE

## 2022-07-25 NOTE — PROCEDURE: TREATMENT REGIMEN
Action 2: Start
Hide Cerave Products: No
Treatment 2: tretinoin 0.1% cream
Treatment 3: spironolactone
Sig For Treatment 1 (If Needed): once daily
Sig For Treatment 2 (If Needed): once daily at bedtime
Action 4: Continue
Treatment 1: benzoyl peroxide 10% wash
Detail Level: Zone
Sig For Treatment 3 (If Needed): 50mg daily

## 2022-07-25 NOTE — PROCEDURE: DIAGNOSIS COMMENT
Comment: Patient is currently using benzoyl peroxide wash, CeraVe moisturizer, and Differin gel. \\nShe will stop using Differin and start using Tretinoin 0.1%. She has used this in the past and tolerated well. \\nShe does report flares around her period. Discussed starting Spironolactone, patient is agreeable to this plan. \\nPatient states recent labs through [facility], will obtain for our records. \\nDiscussed monitoring potassium every 3-6 months.
Detail Level: Simple
Render Risk Assessment In Note?: no

## 2022-07-25 NOTE — PROCEDURE: COUNSELING
Azithromycin Counseling:  I discussed with the patient the risks of azithromycin including but not limited to GI upset, allergic reaction, drug rash, diarrhea, and yeast infections.
Dapsone Pregnancy And Lactation Text: This medication is Pregnancy Category C and is not considered safe during pregnancy or breast feeding.
High Dose Vitamin A Counseling: Side effects reviewed, pt to contact office should one occur.
Spironolactone Pregnancy And Lactation Text: This medication can cause feminization of the male fetus and should be avoided during pregnancy. The active metabolite is also found in breast milk.
Benzoyl Peroxide Counseling: Patient counseled that medicine may cause skin irritation and bleach clothing.  In the event of skin irritation, the patient was advised to reduce the amount of the drug applied or use it less frequently.   The patient verbalized understanding of the proper use and possible adverse effects of benzoyl peroxide.  All of the patient's questions and concerns were addressed.
Topical Clindamycin Pregnancy And Lactation Text: This medication is Pregnancy Category B and is considered safe during pregnancy. It is unknown if it is excreted in breast milk.
Sarecycline Pregnancy And Lactation Text: This medication is Pregnancy Category D and not consider safe during pregnancy. It is also excreted in breast milk.
Azelaic Acid Counseling: Patient counseled that medicine may cause skin irritation and to avoid applying near the eyes.  In the event of skin irritation, the patient was advised to reduce the amount of the drug applied or use it less frequently.   The patient verbalized understanding of the proper use and possible adverse effects of azelaic acid.  All of the patient's questions and concerns were addressed.
Birth Control Pills Pregnancy And Lactation Text: This medication should be avoided if pregnant and for the first 30 days post-partum.
Isotretinoin Counseling: Patient should get monthly blood tests, not donate blood, not drive at night if vision affected, not share medication, and not undergo elective surgery for 6 months after tx completed. Side effects reviewed, pt to contact office should one occur.
Tazorac Pregnancy And Lactation Text: This medication is not safe during pregnancy. It is unknown if this medication is excreted in breast milk.
Detail Level: Detailed
Bactrim Pregnancy And Lactation Text: This medication is Pregnancy Category D and is known to cause fetal risk.  It is also excreted in breast milk.
Erythromycin Counseling:  I discussed with the patient the risks of erythromycin including but not limited to GI upset, allergic reaction, drug rash, diarrhea, increase in liver enzymes, and yeast infections.
Aklief counseling:  Patient advised to apply a pea-sized amount only at bedtime and wait 30 minutes after washing their face before applying.  If too drying, patient may add a non-comedogenic moisturizer.  The most commonly reported side effects including irritation, redness, scaling, dryness, stinging, burning, itching, and increased risk of sunburn.  The patient verbalized understanding of the proper use and possible adverse effects of retinoids.  All of the patient's questions and concerns were addressed.
Topical Retinoid Pregnancy And Lactation Text: This medication is Pregnancy Category C. It is unknown if this medication is excreted in breast milk.
Winlevi Counseling:  I discussed with the patient the risks of topical clascoterone including but not limited to erythema, scaling, itching, and stinging. Patient voiced their understanding.
Include Pregnancy/Lactation Warning?: No
Doxycycline Counseling:  Patient counseled regarding possible photosensitivity and increased risk for sunburn.  Patient instructed to avoid sunlight, if possible.  When exposed to sunlight, patients should wear protective clothing, sunglasses, and sunscreen.  The patient was instructed to call the office immediately if the following severe adverse effects occur:  hearing changes, easy bruising/bleeding, severe headache, or vision changes.  The patient verbalized understanding of the proper use and possible adverse effects of doxycycline.  All of the patient's questions and concerns were addressed.
High Dose Vitamin A Pregnancy And Lactation Text: High dose vitamin A therapy is contraindicated during pregnancy and breast feeding.
Azithromycin Pregnancy And Lactation Text: This medication is considered safe during pregnancy and is also secreted in breast milk.
Tetracycline Counseling: Patient counseled regarding possible photosensitivity and increased risk for sunburn.  Patient instructed to avoid sunlight, if possible.  When exposed to sunlight, patients should wear protective clothing, sunglasses, and sunscreen.  The patient was instructed to call the office immediately if the following severe adverse effects occur:  hearing changes, easy bruising/bleeding, severe headache, or vision changes.  The patient verbalized understanding of the proper use and possible adverse effects of tetracycline.  All of the patient's questions and concerns were addressed. Patient understands to avoid pregnancy while on therapy due to potential birth defects.
Benzoyl Peroxide Pregnancy And Lactation Text: This medication is Pregnancy Category C. It is unknown if benzoyl peroxide is excreted in breast milk.
Topical Sulfur Applications Counseling: Topical Sulfur Counseling: Patient counseled that this medication may cause skin irritation or allergic reactions.  In the event of skin irritation, the patient was advised to reduce the amount of the drug applied or use it less frequently.   The patient verbalized understanding of the proper use and possible adverse effects of topical sulfur application.  All of the patient's questions and concerns were addressed.
Dapsone Counseling: I discussed with the patient the risks of dapsone including but not limited to hemolytic anemia, agranulocytosis, rashes, methemoglobinemia, kidney failure, peripheral neuropathy, headaches, GI upset, and liver toxicity.  Patients who start dapsone require monitoring including baseline LFTs and weekly CBCs for the first month, then every month thereafter.  The patient verbalized understanding of the proper use and possible adverse effects of dapsone.  All of the patient's questions and concerns were addressed.
Isotretinoin Pregnancy And Lactation Text: This medication is Pregnancy Category X and is considered extremely dangerous during pregnancy. It is unknown if it is excreted in breast milk.
Azelaic Acid Pregnancy And Lactation Text: This medication is considered safe during pregnancy and breast feeding.
Spironolactone Counseling: Patient advised regarding risks of diarrhea, abdominal pain, hyperkalemia, birth defects (for female patients), liver toxicity and renal toxicity. The patient may need blood work to monitor liver and kidney function and potassium levels while on therapy. The patient verbalized understanding of the proper use and possible adverse effects of spironolactone.  All of the patient's questions and concerns were addressed.
Topical Clindamycin Counseling: Patient counseled that this medication may cause skin irritation or allergic reactions.  In the event of skin irritation, the patient was advised to reduce the amount of the drug applied or use it less frequently.   The patient verbalized understanding of the proper use and possible adverse effects of clindamycin.  All of the patient's questions and concerns were addressed.
Birth Control Pills Counseling: Birth Control Pill Counseling: I discussed with the patient the potential side effects of OCPs including but not limited to increased risk of stroke, heart attack, thrombophlebitis, deep venous thrombosis, hepatic adenomas, breast changes, GI upset, headaches, and depression.  The patient verbalized understanding of the proper use and possible adverse effects of OCPs. All of the patient's questions and concerns were addressed.
Erythromycin Pregnancy And Lactation Text: This medication is Pregnancy Category B and is considered safe during pregnancy. It is also excreted in breast milk.
Aklief Pregnancy And Lactation Text: It is unknown if this medication is safe to use during pregnancy.  It is unknown if this medication is excreted in breast milk.  Breastfeeding women should use the topical cream on the smallest area of the skin for the shortest time needed while breastfeeding.  Do not apply to nipple and areola.
Sarecycline Counseling: Patient advised regarding possible photosensitivity and discoloration of the teeth, skin, lips, tongue and gums.  Patient instructed to avoid sunlight, if possible.  When exposed to sunlight, patients should wear protective clothing, sunglasses, and sunscreen.  The patient was instructed to call the office immediately if the following severe adverse effects occur:  hearing changes, easy bruising/bleeding, severe headache, or vision changes.  The patient verbalized understanding of the proper use and possible adverse effects of sarecycline.  All of the patient's questions and concerns were addressed.
Tazorac Counseling:  Patient advised that medication is irritating and drying.  Patient may need to apply sparingly and wash off after an hour before eventually leaving it on overnight.  The patient verbalized understanding of the proper use and possible adverse effects of tazorac.  All of the patient's questions and concerns were addressed.
Winlevi Pregnancy And Lactation Text: This medication is considered safe during pregnancy and breastfeeding.
Bactrim Counseling:  I discussed with the patient the risks of sulfa antibiotics including but not limited to GI upset, allergic reaction, drug rash, diarrhea, dizziness, photosensitivity, and yeast infections.  Rarely, more serious reactions can occur including but not limited to aplastic anemia, agranulocytosis, methemoglobinemia, blood dyscrasias, liver or kidney failure, lung infiltrates or desquamative/blistering drug rashes.
Doxycycline Pregnancy And Lactation Text: This medication is Pregnancy Category D and not consider safe during pregnancy. It is also excreted in breast milk but is considered safe for shorter treatment courses.
Minocycline Counseling: Patient advised regarding possible photosensitivity and discoloration of the teeth, skin, lips, tongue and gums.  Patient instructed to avoid sunlight, if possible.  When exposed to sunlight, patients should wear protective clothing, sunglasses, and sunscreen.  The patient was instructed to call the office immediately if the following severe adverse effects occur:  hearing changes, easy bruising/bleeding, severe headache, or vision changes.  The patient verbalized understanding of the proper use and possible adverse effects of minocycline.  All of the patient's questions and concerns were addressed.
Topical Retinoid counseling:  Patient advised to apply a pea-sized amount only at bedtime and wait 30 minutes after washing their face before applying.  If too drying, patient may add a non-comedogenic moisturizer. The patient verbalized understanding of the proper use and possible adverse effects of retinoids.  All of the patient's questions and concerns were addressed.
Topical Sulfur Applications Pregnancy And Lactation Text: This medication is Pregnancy Category C and has an unknown safety profile during pregnancy. It is unknown if this topical medication is excreted in breast milk.

## 2022-11-09 ENCOUNTER — APPOINTMENT (RX ONLY)
Dept: URBAN - METROPOLITAN AREA CLINIC 6 | Facility: CLINIC | Age: 42
Setting detail: DERMATOLOGY
End: 2022-11-09

## 2022-11-09 DIAGNOSIS — L70.8 OTHER ACNE: ICD-10-CM

## 2022-11-09 DIAGNOSIS — L72.8 OTHER FOLLICULAR CYSTS OF THE SKIN AND SUBCUTANEOUS TISSUE: ICD-10-CM

## 2022-11-09 PROCEDURE — ? ORDER TESTS

## 2022-11-09 PROCEDURE — ? TREATMENT REGIMEN

## 2022-11-09 PROCEDURE — ? PRESCRIPTION

## 2022-11-09 PROCEDURE — ? INTRALESIONAL KENALOG

## 2022-11-09 PROCEDURE — 99213 OFFICE O/P EST LOW 20 MIN: CPT | Mod: 25

## 2022-11-09 PROCEDURE — 11900 INJECT SKIN LESIONS </W 7: CPT

## 2022-11-09 PROCEDURE — ? DIAGNOSIS COMMENT

## 2022-11-09 PROCEDURE — ? COUNSELING

## 2022-11-09 PROCEDURE — ? PHOTO-DOCUMENTATION

## 2022-11-09 RX ORDER — SPIRONOLACTONE 50 MG/1
1 TABLET, FILM COATED ORAL BID
Qty: 60 | Refills: 2 | Status: ERX

## 2022-11-09 ASSESSMENT — LOCATION SIMPLE DESCRIPTION DERM
LOCATION SIMPLE: RIGHT CHEEK
LOCATION SIMPLE: LEFT CHEEK
LOCATION SIMPLE: SUBMENTAL CHIN

## 2022-11-09 ASSESSMENT — LOCATION DETAILED DESCRIPTION DERM
LOCATION DETAILED: RIGHT INFERIOR CENTRAL MALAR CHEEK
LOCATION DETAILED: SUBMENTAL CHIN
LOCATION DETAILED: LEFT INFERIOR CENTRAL MALAR CHEEK

## 2022-11-09 ASSESSMENT — LOCATION ZONE DERM: LOCATION ZONE: FACE

## 2022-11-09 NOTE — PROCEDURE: DIAGNOSIS COMMENT
Comment: Patient is currently using benzoyl peroxide wash, CeraVe moisturizer, and Differin gel. \\nShe will stop using Differin and start using Tretinoin 0.1%. She has used this in the past and tolerated well. \\nShe does report flares around her period. Discussed starting Spironolactone, patient is agreeable to this plan. \\nPatient states recent labs through [facility], will obtain for our records. \\nDiscussed monitoring potassium every 3-6 months.
Detail Level: Simple
Render Risk Assessment In Note?: no

## 2022-11-09 NOTE — PROCEDURE: INTRALESIONAL KENALOG
Include Z78.9 (Other Specified Conditions Influencing Health Status) As An Associated Diagnosis?: No
Concentration Of Kenalog Solution Injected (Mg/Ml): 2.5
Ndc# For Kenalog Only: 9310-4402-91
Detail Level: Detailed
How Many Mls Were Removed From The 40 Mg/Ml (10ml) Vial When Preparing The Injectable Solution?: 0
Medical Necessity Clause: This procedure was medically necessary because the lesions that were treated were:
Validate Note Data When Using Inventory: Yes
Lot # For Kenalog (Optional): [id number]
Administered By (Optional): Nikky Sharp
Expiration Date For Kenalog (Optional): 09/2023
Consent: The risks of atrophy were reviewed with the patient.
Kenalog Preparation: Kenalog
Total Volume (Ccs): 0.025

## 2022-11-09 NOTE — PROCEDURE: TREATMENT REGIMEN
Action 2: Continue
Hide Cerave Products: No
Treatment 2: tretinoin 0.1% cream
Treatment 3: spironolactone
Sig For Treatment 1 (If Needed): once daily
Sig For Treatment 2 (If Needed): once daily at bedtime
Treatment 1: benzoyl peroxide 10% wash
Detail Level: Zone
Sig For Treatment 3 (If Needed): 50mg daily

## 2022-12-15 ENCOUNTER — HOSPITAL ENCOUNTER (OUTPATIENT)
Facility: MEDICAL CENTER | Age: 42
End: 2022-12-15
Attending: FAMILY MEDICINE
Payer: COMMERCIAL

## 2022-12-15 ENCOUNTER — OFFICE VISIT (OUTPATIENT)
Dept: MEDICAL GROUP | Facility: PHYSICIAN GROUP | Age: 42
End: 2022-12-15
Payer: COMMERCIAL

## 2022-12-15 VITALS
OXYGEN SATURATION: 96 % | TEMPERATURE: 99.4 F | WEIGHT: 145 LBS | DIASTOLIC BLOOD PRESSURE: 62 MMHG | BODY MASS INDEX: 24.16 KG/M2 | HEART RATE: 77 BPM | SYSTOLIC BLOOD PRESSURE: 114 MMHG | HEIGHT: 65 IN

## 2022-12-15 DIAGNOSIS — R39.9 UTI SYMPTOMS: ICD-10-CM

## 2022-12-15 DIAGNOSIS — N39.0 RECURRENT UTI: ICD-10-CM

## 2022-12-15 LAB
APPEARANCE UR: NORMAL
BILIRUB UR STRIP-MCNC: NEGATIVE MG/DL
COLOR UR AUTO: NORMAL
GLUCOSE UR STRIP.AUTO-MCNC: NEGATIVE MG/DL
KETONES UR STRIP.AUTO-MCNC: NEGATIVE MG/DL
LEUKOCYTE ESTERASE UR QL STRIP.AUTO: NEGATIVE
NITRITE UR QL STRIP.AUTO: NEGATIVE
PH UR STRIP.AUTO: 7 [PH] (ref 5–8)
PROT UR QL STRIP: NEGATIVE MG/DL
RBC UR QL AUTO: NORMAL
SP GR UR STRIP.AUTO: 1.02
UROBILINOGEN UR STRIP-MCNC: 0.2 MG/DL

## 2022-12-15 PROCEDURE — 81002 URINALYSIS NONAUTO W/O SCOPE: CPT | Performed by: FAMILY MEDICINE

## 2022-12-15 PROCEDURE — 99214 OFFICE O/P EST MOD 30 MIN: CPT | Performed by: FAMILY MEDICINE

## 2022-12-15 PROCEDURE — 87086 URINE CULTURE/COLONY COUNT: CPT

## 2022-12-15 RX ORDER — SPIRONOLACTONE 50 MG/1
50 TABLET, FILM COATED ORAL 2 TIMES DAILY
COMMUNITY
Start: 2022-11-09

## 2022-12-15 RX ORDER — NITROFURANTOIN 25; 75 MG/1; MG/1
100 CAPSULE ORAL 2 TIMES DAILY
Qty: 10 CAPSULE | Refills: 0 | Status: SHIPPED | OUTPATIENT
Start: 2022-12-15 | End: 2022-12-20

## 2022-12-15 ASSESSMENT — FIBROSIS 4 INDEX: FIB4 SCORE: 0.97

## 2022-12-15 NOTE — PROGRESS NOTES
"Subjective:     Chief Complaint   Patient presents with    UTI     Symptoms persist        HPI:   Kimmie presents today to discuss the following.    UTI symptoms  New issue  Started having UTI symptoms 2 days ago  Started taking macrobid 2 tabs  Getting them often, every 2-3 mo    Past Medical History:   Diagnosis Date    Acne vulgaris     Anxiety     Depression     Psoriasis age 16       Current Outpatient Medications Ordered in Epic   Medication Sig Dispense Refill    spironolactone (ALDACTONE) 50 MG Tab Take 50 mg by mouth 2 times a day.      nitrofurantoin (MACROBID) 100 MG Cap Take 1 Capsule by mouth 2 times a day for 5 days. 10 Capsule 0    citalopram (CELEXA) 40 MG Tab TAKE HALF OF A TABLET (20 MG) BY MOUTH DAILY      tretinoin (RETIN-A) 0.05 % cream APPLY TOPICALLY TO AFFECTED AREAS NIGHTLY 45 g 0    ALPRAZolam (XANAX) 1 MG Tab Take 1 mg by mouth at bedtime as needed for Sleep.       No current Epic-ordered facility-administered medications on file.       Allergies:  Pcn [penicillins]    Health Maintenance: Completed    ROS:  Gen: no fevers/chills, no changes in weight  Eyes: no changes in vision  Pulm: no sob, no cough  CV: no chest pain, no palpitations  GI: no nausea/vomiting, no diarrhea      Objective:     Exam:  /62 (BP Location: Left arm, Patient Position: Sitting, BP Cuff Size: Adult)   Pulse 77   Temp 37.4 °C (99.4 °F) (Temporal)   Ht 1.651 m (5' 5\")   Wt 65.8 kg (145 lb)   SpO2 96%   BMI 24.13 kg/m²  Body mass index is 24.13 kg/m².        Constitutional: Alert, no distress, well-groomed.  Skin: Warm, dry, good turgor, no rashes in visible areas.  Eye: Equal, round and reactive, conjunctiva clear, lids normal.  ENMT: Lips without lesions, good dentition, moist mucous membranes.  Neck: Trachea midline, no masses, no thyromegaly.  Respiratory: Unlabored respiratory effort, no cough.  MSK: Normal gait, moves all extremities.  Neuro: Grossly non-focal.   Psych: Alert and oriented x3, normal " affect and mood.        Assessment & Plan:     42 y.o. female with the following -     1. UTI symptoms  Started having new UTI symptoms over the past couple of days.  She started taking some leftover Macrobid which likely skewed our results in the office today.  However there is some trace blood on UA.  I will continue regimen with nitrofurantoin for 5 days.  - POCT Urinalysis  - nitrofurantoin (MACROBID) 100 MG Cap; Take 1 Capsule by mouth 2 times a day for 5 days.  Dispense: 10 Capsule; Refill: 0  - URINE CULTURE(NEW); Future    2. Recurrent UTI  Chronic, recurrent condition.  The patient has been having recurrent UTI every 3 to 4 months.  I will refer to urology for further evaluation.  - Referral to Urology      No follow-ups on file.          Please note that this dictation was created using voice recognition software. I have made every reasonable attempt to correct obvious errors, but I expect that there are errors of grammar and possibly content that I did not discover before finalizing the note.

## 2022-12-15 NOTE — ASSESSMENT & PLAN NOTE
New issue  Started having UTI symptoms 2 days ago  Started taking macrobid 2 tabs  Getting them often, every 2-3 mo

## 2022-12-18 LAB
BACTERIA UR CULT: NORMAL
SIGNIFICANT IND 70042: NORMAL
SITE SITE: NORMAL
SOURCE SOURCE: NORMAL

## 2023-02-21 ENCOUNTER — APPOINTMENT (RX ONLY)
Dept: URBAN - METROPOLITAN AREA CLINIC 6 | Facility: CLINIC | Age: 43
Setting detail: DERMATOLOGY
End: 2023-02-21

## 2023-02-21 DIAGNOSIS — L70.8 OTHER ACNE: ICD-10-CM | Status: WELL CONTROLLED

## 2023-02-21 PROCEDURE — ? PHOTO-DOCUMENTATION

## 2023-02-21 PROCEDURE — ? DIAGNOSIS COMMENT

## 2023-02-21 PROCEDURE — ? PRESCRIPTION

## 2023-02-21 PROCEDURE — 99213 OFFICE O/P EST LOW 20 MIN: CPT

## 2023-02-21 PROCEDURE — ? TREATMENT REGIMEN

## 2023-02-21 PROCEDURE — ? COUNSELING

## 2023-02-21 PROCEDURE — ? ORDER TESTS

## 2023-02-21 RX ORDER — SPIRONOLACTONE 50 MG/1
1 TABLET, FILM COATED ORAL BID
Qty: 180 | Refills: 1 | Status: ERX

## 2023-02-21 ASSESSMENT — LOCATION ZONE DERM: LOCATION ZONE: FACE

## 2023-02-21 ASSESSMENT — LOCATION SIMPLE DESCRIPTION DERM
LOCATION SIMPLE: LEFT CHEEK
LOCATION SIMPLE: RIGHT CHEEK

## 2023-02-21 NOTE — PROCEDURE: DIAGNOSIS COMMENT
Comment: Patient is mostly clear today and will continue on current regimen. Lab order provided to check potassium level.
Detail Level: Simple
Render Risk Assessment In Note?: no

## 2023-02-21 NOTE — PROCEDURE: TREATMENT REGIMEN
Action 2: Continue
Hide Cerave Products: No
Treatment 2: tretinoin 0.1% cream
Treatment 3: spironolactone
Sig For Treatment 1 (If Needed): once daily
Sig For Treatment 2 (If Needed): once daily at bedtime
Treatment 1: benzoyl peroxide 10% wash
Detail Level: Zone
Sig For Treatment 3 (If Needed): 50mg twice daily

## 2023-04-17 RX ORDER — SPIRONOLACTONE 50 MG/1
1 TABLET, FILM COATED ORAL BID
Qty: 180 | Refills: 1 | Status: ERX

## 2023-04-18 RX ORDER — TRETIONIN 1 MG/G
1 CREAM TOPICAL QHS
Qty: 45 | Refills: 3 | Status: ERX

## 2023-05-16 ENCOUNTER — HOSPITAL ENCOUNTER (OUTPATIENT)
Dept: LAB | Facility: MEDICAL CENTER | Age: 43
End: 2023-05-16
Attending: OBSTETRICS & GYNECOLOGY
Payer: COMMERCIAL

## 2023-05-16 LAB
ESTRADIOL SERPL-MCNC: 123 PG/ML
FSH SERPL-ACNC: 7.5 MIU/ML
LH SERPL-ACNC: 10.2 IU/L
PROGEST SERPL-MCNC: 0.45 NG/ML
T3FREE SERPL-MCNC: 2.64 PG/ML (ref 2–4.4)
T4 FREE SERPL-MCNC: 1.24 NG/DL (ref 0.93–1.7)
TSH SERPL DL<=0.005 MIU/L-ACNC: 2.54 UIU/ML (ref 0.38–5.33)

## 2023-05-16 PROCEDURE — 83002 ASSAY OF GONADOTROPIN (LH): CPT

## 2023-05-16 PROCEDURE — 84439 ASSAY OF FREE THYROXINE: CPT

## 2023-05-16 PROCEDURE — 84481 FREE ASSAY (FT-3): CPT

## 2023-05-16 PROCEDURE — 83001 ASSAY OF GONADOTROPIN (FSH): CPT

## 2023-05-16 PROCEDURE — 84270 ASSAY OF SEX HORMONE GLOBUL: CPT

## 2023-05-16 PROCEDURE — 84402 ASSAY OF FREE TESTOSTERONE: CPT

## 2023-05-16 PROCEDURE — 84443 ASSAY THYROID STIM HORMONE: CPT

## 2023-05-16 PROCEDURE — 82670 ASSAY OF TOTAL ESTRADIOL: CPT

## 2023-05-16 PROCEDURE — 84403 ASSAY OF TOTAL TESTOSTERONE: CPT

## 2023-05-16 PROCEDURE — 36415 COLL VENOUS BLD VENIPUNCTURE: CPT

## 2023-05-16 PROCEDURE — 84144 ASSAY OF PROGESTERONE: CPT

## 2023-05-20 LAB
SHBG SERPL-SCNC: 143 NMOL/L (ref 25–122)
TESTOST FREE SERPL-MCNC: 1.8 PG/ML (ref 1.1–5.8)
TESTOST SERPL-MCNC: 31 NG/DL (ref 9–55)

## 2023-08-23 ENCOUNTER — TELEPHONE (OUTPATIENT)
Dept: HEALTH INFORMATION MANAGEMENT | Facility: OTHER | Age: 43
End: 2023-08-23

## 2023-10-19 NOTE — ASSESSMENT & PLAN NOTE
Chronic in nature. Stable. Patient uses topical steroids as needed for this condition. Patient states that she has not had plaques recently.    Bactrim Counseling:  I discussed with the patient the risks of sulfa antibiotics including but not limited to GI upset, allergic reaction, drug rash, diarrhea, dizziness, photosensitivity, and yeast infections.  Rarely, more serious reactions can occur including but not limited to aplastic anemia, agranulocytosis, methemoglobinemia, blood dyscrasias, liver or kidney failure, lung infiltrates or desquamative/blistering drug rashes.

## 2023-11-07 ENCOUNTER — RX ONLY (OUTPATIENT)
Age: 43
Setting detail: RX ONLY
End: 2023-11-07

## 2023-11-07 RX ORDER — TRETIONIN 1 MG/G
1 CREAM TOPICAL QHS
Qty: 45 | Refills: 3 | Status: ERX | COMMUNITY
Start: 2023-11-07

## 2023-11-15 ENCOUNTER — APPOINTMENT (RX ONLY)
Dept: URBAN - METROPOLITAN AREA CLINIC 6 | Facility: CLINIC | Age: 43
Setting detail: DERMATOLOGY
End: 2023-11-15

## 2023-11-15 DIAGNOSIS — L70.8 OTHER ACNE: ICD-10-CM

## 2023-11-15 PROCEDURE — 99214 OFFICE O/P EST MOD 30 MIN: CPT

## 2023-11-15 PROCEDURE — ? COUNSELING

## 2023-11-15 PROCEDURE — ? DIAGNOSIS COMMENT

## 2023-11-15 PROCEDURE — ? ORDER TESTS

## 2023-11-15 PROCEDURE — ? PRESCRIPTION

## 2023-11-15 PROCEDURE — ? PRESCRIPTION MEDICATION MANAGEMENT

## 2023-11-15 RX ORDER — SPIRONOLACTONE 50 MG/1
1 TABLET, FILM COATED ORAL BID
Qty: 180 | Refills: 1 | Status: ERX | COMMUNITY
Start: 2023-11-15

## 2023-11-15 RX ORDER — TRETIONIN 1 MG/G
1 CREAM TOPICAL QHS
Qty: 45 | Refills: 5 | Status: ERX

## 2023-11-15 RX ADMIN — SPIRONOLACTONE 1: 50 TABLET, FILM COATED ORAL at 00:00

## 2023-11-15 ASSESSMENT — LOCATION DETAILED DESCRIPTION DERM
LOCATION DETAILED: LEFT INFERIOR CENTRAL MALAR CHEEK
LOCATION DETAILED: RIGHT INFERIOR CENTRAL MALAR CHEEK

## 2023-11-15 ASSESSMENT — LOCATION SIMPLE DESCRIPTION DERM
LOCATION SIMPLE: RIGHT CHEEK
LOCATION SIMPLE: LEFT CHEEK

## 2023-11-15 ASSESSMENT — LOCATION ZONE DERM: LOCATION ZONE: FACE

## 2023-11-15 NOTE — PROCEDURE: DIAGNOSIS COMMENT
Comment: Patient stopped taking Spironolactone a few months ago and has flared since then. \\nWill restart at this time. Lab order provided to check potassium.
Detail Level: Simple
Render Risk Assessment In Note?: no

## 2023-11-15 NOTE — PROCEDURE: ORDER TESTS
Expected Date Of Service: 11/15/2023
Performing Laboratory: 0
Billing Type: Third-Party Bill
Bill For Surgical Tray: no

## 2023-12-20 ENCOUNTER — APPOINTMENT (RX ONLY)
Dept: URBAN - METROPOLITAN AREA CLINIC 6 | Facility: CLINIC | Age: 43
Setting detail: DERMATOLOGY
End: 2023-12-20

## 2023-12-20 DIAGNOSIS — K13.0 DISEASES OF LIPS: ICD-10-CM

## 2023-12-20 DIAGNOSIS — L91.8 OTHER HYPERTROPHIC DISORDERS OF THE SKIN: ICD-10-CM

## 2023-12-20 PROCEDURE — 17110 DESTRUCTION B9 LES UP TO 14: CPT

## 2023-12-20 PROCEDURE — ? LIQUID NITROGEN

## 2023-12-20 PROCEDURE — ? COUNSELING

## 2023-12-20 PROCEDURE — ? PRESCRIPTION MEDICATION MANAGEMENT

## 2023-12-20 PROCEDURE — ? PRESCRIPTION

## 2023-12-20 PROCEDURE — 99213 OFFICE O/P EST LOW 20 MIN: CPT | Mod: 25

## 2023-12-20 RX ORDER — NYSTATIN CREAM 100000 [USP'U]/G
1 CREAM TOPICAL QD
Qty: 15 | Refills: 2 | Status: ERX | COMMUNITY
Start: 2023-12-20

## 2023-12-20 RX ORDER — TACROLIMUS 1 MG/G
1 OINTMENT TOPICAL BID
Qty: 60 | Refills: 2 | Status: ERX | COMMUNITY
Start: 2023-12-20

## 2023-12-20 RX ADMIN — NYSTATIN CREAM 1: 100000 CREAM TOPICAL at 00:00

## 2023-12-20 RX ADMIN — TACROLIMUS 1: 1 OINTMENT TOPICAL at 00:00

## 2023-12-20 ASSESSMENT — LOCATION DETAILED DESCRIPTION DERM
LOCATION DETAILED: RIGHT INFERIOR LATERAL NECK
LOCATION DETAILED: RIGHT ORAL COMMISSURE
LOCATION DETAILED: RIGHT INFERIOR ANTERIOR NECK

## 2023-12-20 ASSESSMENT — LOCATION SIMPLE DESCRIPTION DERM
LOCATION SIMPLE: RIGHT ANTERIOR NECK
LOCATION SIMPLE: RIGHT LIP

## 2023-12-20 ASSESSMENT — LOCATION ZONE DERM
LOCATION ZONE: LIP
LOCATION ZONE: NECK

## 2023-12-20 NOTE — PROCEDURE: PRESCRIPTION MEDICATION MANAGEMENT
Detail Level: Zone
Render In Strict Bullet Format?: No
Initiate Treatment: Start nystatin ointment daily x 1 week then switch to \\nTacrolimus 0.1% ointment BID until clear

## 2023-12-20 NOTE — PROCEDURE: LIQUID NITROGEN
Show Aperture Variable?: Yes
Detail Level: Detailed
Medical Necessity Information: It is in your best interest to select a reason for this procedure from the list below. All of these items fulfill various CMS LCD requirements except the new and changing color options.
Add 52 Modifier (Optional): no
Consent: The patient's consent was obtained including but not limited to risks of crusting, scabbing, blistering, scarring, darker or lighter pigmentary change, recurrence, incomplete removal and infection.
Medical Necessity Clause: This procedure was medically necessary because the lesions that were treated were:
Spray Paint Text: The liquid nitrogen was applied to the skin utilizing a spray paint frosting technique.
Number Of Freeze-Thaw Cycles: 3 freeze-thaw cycles
Post-Care Instructions: I reviewed with the patient in detail post-care instructions. Patient is to wear sunprotection, and avoid picking at any of the treated lesions. Pt may apply Vaseline to crusted or scabbing areas.

## 2024-05-10 ENCOUNTER — HOSPITAL ENCOUNTER (OUTPATIENT)
Dept: LAB | Facility: MEDICAL CENTER | Age: 44
End: 2024-05-10
Attending: NURSE PRACTITIONER
Payer: COMMERCIAL

## 2024-05-10 ENCOUNTER — HOSPITAL ENCOUNTER (OUTPATIENT)
Dept: LAB | Facility: MEDICAL CENTER | Age: 44
End: 2024-05-10
Attending: PHYSICIAN ASSISTANT
Payer: COMMERCIAL

## 2024-05-10 LAB
ALBUMIN SERPL BCP-MCNC: 4.5 G/DL (ref 3.2–4.9)
ALBUMIN/GLOB SERPL: 2.3 G/DL
ALP SERPL-CCNC: 41 U/L (ref 30–99)
ALT SERPL-CCNC: 13 U/L (ref 2–50)
ANION GAP SERPL CALC-SCNC: 10 MMOL/L (ref 7–16)
AST SERPL-CCNC: 19 U/L (ref 12–45)
BASOPHILS # BLD AUTO: 0.9 % (ref 0–1.8)
BASOPHILS # BLD: 0.04 K/UL (ref 0–0.12)
BILIRUB SERPL-MCNC: 0.3 MG/DL (ref 0.1–1.5)
BUN SERPL-MCNC: 13 MG/DL (ref 8–22)
CALCIUM ALBUM COR SERPL-MCNC: 8.2 MG/DL (ref 8.5–10.5)
CALCIUM SERPL-MCNC: 8.6 MG/DL (ref 8.5–10.5)
CHLORIDE SERPL-SCNC: 104 MMOL/L (ref 96–112)
CHOLEST SERPL-MCNC: 201 MG/DL (ref 100–199)
CO2 SERPL-SCNC: 24 MMOL/L (ref 20–33)
CREAT SERPL-MCNC: 0.71 MG/DL (ref 0.5–1.4)
EOSINOPHIL # BLD AUTO: 0.05 K/UL (ref 0–0.51)
EOSINOPHIL NFR BLD: 1.1 % (ref 0–6.9)
ERYTHROCYTE [DISTWIDTH] IN BLOOD BY AUTOMATED COUNT: 47.8 FL (ref 35.9–50)
EST. AVERAGE GLUCOSE BLD GHB EST-MCNC: 103 MG/DL
FERRITIN SERPL-MCNC: 14.7 NG/ML (ref 10–291)
GFR SERPLBLD CREATININE-BSD FMLA CKD-EPI: 106 ML/MIN/1.73 M 2
GLOBULIN SER CALC-MCNC: 2 G/DL (ref 1.9–3.5)
GLUCOSE SERPL-MCNC: 99 MG/DL (ref 65–99)
HBA1C MFR BLD: 5.2 % (ref 4–5.6)
HCT VFR BLD AUTO: 41.1 % (ref 37–47)
HDLC SERPL-MCNC: 91 MG/DL
HGB BLD-MCNC: 13.6 G/DL (ref 12–16)
IMM GRANULOCYTES # BLD AUTO: 0.01 K/UL (ref 0–0.11)
IMM GRANULOCYTES NFR BLD AUTO: 0.2 % (ref 0–0.9)
IRON SATN MFR SERPL: 25 % (ref 15–55)
IRON SERPL-MCNC: 95 UG/DL (ref 40–170)
LDLC SERPL CALC-MCNC: 97 MG/DL
LYMPHOCYTES # BLD AUTO: 1.37 K/UL (ref 1–4.8)
LYMPHOCYTES NFR BLD: 31.1 % (ref 22–41)
MCH RBC QN AUTO: 30.4 PG (ref 27–33)
MCHC RBC AUTO-ENTMCNC: 33.1 G/DL (ref 32.2–35.5)
MCV RBC AUTO: 91.7 FL (ref 81.4–97.8)
MONOCYTES # BLD AUTO: 0.27 K/UL (ref 0–0.85)
MONOCYTES NFR BLD AUTO: 6.1 % (ref 0–13.4)
NEUTROPHILS # BLD AUTO: 2.66 K/UL (ref 1.82–7.42)
NEUTROPHILS NFR BLD: 60.6 % (ref 44–72)
NRBC # BLD AUTO: 0 K/UL
NRBC BLD-RTO: 0 /100 WBC (ref 0–0.2)
PLATELET # BLD AUTO: 178 K/UL (ref 164–446)
PMV BLD AUTO: 12.2 FL (ref 9–12.9)
POTASSIUM SERPL-SCNC: 4.9 MMOL/L (ref 3.6–5.5)
POTASSIUM SERPL-SCNC: 5 MMOL/L (ref 3.6–5.5)
PROT SERPL-MCNC: 6.5 G/DL (ref 6–8.2)
RBC # BLD AUTO: 4.48 M/UL (ref 4.2–5.4)
SODIUM SERPL-SCNC: 138 MMOL/L (ref 135–145)
T3FREE SERPL-MCNC: 2.74 PG/ML (ref 2–4.4)
T4 FREE SERPL-MCNC: 1.06 NG/DL (ref 0.93–1.7)
TIBC SERPL-MCNC: 380 UG/DL (ref 250–450)
TRIGL SERPL-MCNC: 64 MG/DL (ref 0–149)
TSH SERPL DL<=0.005 MIU/L-ACNC: 1.4 UIU/ML (ref 0.38–5.33)
UIBC SERPL-MCNC: 285 UG/DL (ref 110–370)
WBC # BLD AUTO: 4.4 K/UL (ref 4.8–10.8)

## 2024-05-12 LAB — INSULIN P FAST SERPL-ACNC: 5 UIU/ML (ref 3–25)

## 2024-05-13 LAB — FASTING STATUS PATIENT QL REPORTED: NORMAL

## 2024-05-14 ENCOUNTER — RX ONLY (OUTPATIENT)
Age: 44
Setting detail: RX ONLY
End: 2024-05-14

## 2024-05-14 RX ORDER — SPIRONOLACTONE 50 MG/1
1 TABLET, FILM COATED ORAL BID
Qty: 180 | Refills: 1 | Status: ERX

## 2024-06-06 ENCOUNTER — APPOINTMENT (RX ONLY)
Dept: URBAN - METROPOLITAN AREA CLINIC 6 | Facility: CLINIC | Age: 44
Setting detail: DERMATOLOGY
End: 2024-06-06

## 2024-06-06 DIAGNOSIS — L74.51 PRIMARY FOCAL HYPERHIDROSIS: ICD-10-CM

## 2024-06-06 DIAGNOSIS — L70.8 OTHER ACNE: ICD-10-CM

## 2024-06-06 PROBLEM — L74.519 PRIMARY FOCAL HYPERHIDROSIS, UNSPECIFIED: Status: ACTIVE | Noted: 2024-06-06

## 2024-06-06 PROCEDURE — ? ADDITIONAL NOTES

## 2024-06-06 PROCEDURE — ? PRESCRIPTION MEDICATION MANAGEMENT

## 2024-06-06 PROCEDURE — 99214 OFFICE O/P EST MOD 30 MIN: CPT

## 2024-06-06 PROCEDURE — ? COUNSELING

## 2024-06-06 PROCEDURE — ? PRESCRIPTION

## 2024-06-06 RX ORDER — OXYBUTYNIN CHLORIDE 2.5 MG/1
TABLET ORAL QD
Qty: 30 | Refills: 2 | Status: ERX | COMMUNITY
Start: 2024-06-06

## 2024-06-06 RX ORDER — TRETIONIN 1 MG/G
1 CREAM TOPICAL QHS
Qty: 45 | Refills: 5 | Status: ERX

## 2024-06-06 RX ORDER — SPIRONOLACTONE 50 MG/1
1 TABLET, FILM COATED ORAL BID
Qty: 180 | Refills: 1 | Status: ERX

## 2024-06-06 RX ADMIN — OXYBUTYNIN CHLORIDE: 2.5 TABLET ORAL at 00:00

## 2024-06-06 ASSESSMENT — LOCATION SIMPLE DESCRIPTION DERM
LOCATION SIMPLE: RIGHT CHEEK
LOCATION SIMPLE: LEFT CHEEK

## 2024-06-06 ASSESSMENT — LOCATION ZONE DERM: LOCATION ZONE: FACE

## 2024-06-06 NOTE — PROCEDURE: PRESCRIPTION MEDICATION MANAGEMENT
Detail Level: Zone
Render In Strict Bullet Format?: No
Continue Regimen: benzoyl peroxide 10% wash once daily\\ntretinoin 0.1% cream once daily at bedtime\\nspironolactone 50mg twice daily

## 2024-06-06 NOTE — PROCEDURE: ADDITIONAL NOTES
Render Risk Assessment In Note?: no
Additional Notes: Recent blood work performed, CBC, CMP, Lipids, Hemoglobin A1C, and Iron all WNL
Detail Level: Simple

## 2024-06-06 NOTE — PROCEDURE: COUNSELING
Azithromycin Counseling:  I discussed with the patient the risks of azithromycin including but not limited to GI upset, allergic reaction, drug rash, diarrhea, and yeast infections.
Dapsone Pregnancy And Lactation Text: This medication is Pregnancy Category C and is not considered safe during pregnancy or breast feeding.
High Dose Vitamin A Counseling: Side effects reviewed, pt to contact office should one occur.
Spironolactone Pregnancy And Lactation Text: This medication can cause feminization of the male fetus and should be avoided during pregnancy. The active metabolite is also found in breast milk.
Benzoyl Peroxide Counseling: Patient counseled that medicine may cause skin irritation and bleach clothing.  In the event of skin irritation, the patient was advised to reduce the amount of the drug applied or use it less frequently.   The patient verbalized understanding of the proper use and possible adverse effects of benzoyl peroxide.  All of the patient's questions and concerns were addressed.
Topical Clindamycin Pregnancy And Lactation Text: This medication is Pregnancy Category B and is considered safe during pregnancy. It is unknown if it is excreted in breast milk.
Sarecycline Pregnancy And Lactation Text: This medication is Pregnancy Category D and not consider safe during pregnancy. It is also excreted in breast milk.
Azelaic Acid Counseling: Patient counseled that medicine may cause skin irritation and to avoid applying near the eyes.  In the event of skin irritation, the patient was advised to reduce the amount of the drug applied or use it less frequently.   The patient verbalized understanding of the proper use and possible adverse effects of azelaic acid.  All of the patient's questions and concerns were addressed.
Birth Control Pills Pregnancy And Lactation Text: This medication should be avoided if pregnant and for the first 30 days post-partum.
Isotretinoin Counseling: Patient should get monthly blood tests, not donate blood, not drive at night if vision affected, not share medication, and not undergo elective surgery for 6 months after tx completed. Side effects reviewed, pt to contact office should one occur.
Tazorac Pregnancy And Lactation Text: This medication is not safe during pregnancy. It is unknown if this medication is excreted in breast milk.
Detail Level: Detailed
Bactrim Pregnancy And Lactation Text: This medication is Pregnancy Category D and is known to cause fetal risk.  It is also excreted in breast milk.
Erythromycin Counseling:  I discussed with the patient the risks of erythromycin including but not limited to GI upset, allergic reaction, drug rash, diarrhea, increase in liver enzymes, and yeast infections.
Aklief counseling:  Patient advised to apply a pea-sized amount only at bedtime and wait 30 minutes after washing their face before applying.  If too drying, patient may add a non-comedogenic moisturizer.  The most commonly reported side effects including irritation, redness, scaling, dryness, stinging, burning, itching, and increased risk of sunburn.  The patient verbalized understanding of the proper use and possible adverse effects of retinoids.  All of the patient's questions and concerns were addressed.
Topical Retinoid Pregnancy And Lactation Text: This medication is Pregnancy Category C. It is unknown if this medication is excreted in breast milk.
Winlevi Counseling:  I discussed with the patient the risks of topical clascoterone including but not limited to erythema, scaling, itching, and stinging. Patient voiced their understanding.
Include Pregnancy/Lactation Warning?: No
Doxycycline Counseling:  Patient counseled regarding possible photosensitivity and increased risk for sunburn.  Patient instructed to avoid sunlight, if possible.  When exposed to sunlight, patients should wear protective clothing, sunglasses, and sunscreen.  The patient was instructed to call the office immediately if the following severe adverse effects occur:  hearing changes, easy bruising/bleeding, severe headache, or vision changes.  The patient verbalized understanding of the proper use and possible adverse effects of doxycycline.  All of the patient's questions and concerns were addressed.
High Dose Vitamin A Pregnancy And Lactation Text: High dose vitamin A therapy is contraindicated during pregnancy and breast feeding.
Azithromycin Pregnancy And Lactation Text: This medication is considered safe during pregnancy and is also secreted in breast milk.
Tetracycline Counseling: Patient counseled regarding possible photosensitivity and increased risk for sunburn.  Patient instructed to avoid sunlight, if possible.  When exposed to sunlight, patients should wear protective clothing, sunglasses, and sunscreen.  The patient was instructed to call the office immediately if the following severe adverse effects occur:  hearing changes, easy bruising/bleeding, severe headache, or vision changes.  The patient verbalized understanding of the proper use and possible adverse effects of tetracycline.  All of the patient's questions and concerns were addressed. Patient understands to avoid pregnancy while on therapy due to potential birth defects.
Benzoyl Peroxide Pregnancy And Lactation Text: This medication is Pregnancy Category C. It is unknown if benzoyl peroxide is excreted in breast milk.
Topical Sulfur Applications Counseling: Topical Sulfur Counseling: Patient counseled that this medication may cause skin irritation or allergic reactions.  In the event of skin irritation, the patient was advised to reduce the amount of the drug applied or use it less frequently.   The patient verbalized understanding of the proper use and possible adverse effects of topical sulfur application.  All of the patient's questions and concerns were addressed.
Dapsone Counseling: I discussed with the patient the risks of dapsone including but not limited to hemolytic anemia, agranulocytosis, rashes, methemoglobinemia, kidney failure, peripheral neuropathy, headaches, GI upset, and liver toxicity.  Patients who start dapsone require monitoring including baseline LFTs and weekly CBCs for the first month, then every month thereafter.  The patient verbalized understanding of the proper use and possible adverse effects of dapsone.  All of the patient's questions and concerns were addressed.
Isotretinoin Pregnancy And Lactation Text: This medication is Pregnancy Category X and is considered extremely dangerous during pregnancy. It is unknown if it is excreted in breast milk.
Azelaic Acid Pregnancy And Lactation Text: This medication is considered safe during pregnancy and breast feeding.
Spironolactone Counseling: Patient advised regarding risks of diarrhea, abdominal pain, hyperkalemia, birth defects (for female patients), liver toxicity and renal toxicity. The patient may need blood work to monitor liver and kidney function and potassium levels while on therapy. The patient verbalized understanding of the proper use and possible adverse effects of spironolactone.  All of the patient's questions and concerns were addressed.
Topical Clindamycin Counseling: Patient counseled that this medication may cause skin irritation or allergic reactions.  In the event of skin irritation, the patient was advised to reduce the amount of the drug applied or use it less frequently.   The patient verbalized understanding of the proper use and possible adverse effects of clindamycin.  All of the patient's questions and concerns were addressed.
Birth Control Pills Counseling: Birth Control Pill Counseling: I discussed with the patient the potential side effects of OCPs including but not limited to increased risk of stroke, heart attack, thrombophlebitis, deep venous thrombosis, hepatic adenomas, breast changes, GI upset, headaches, and depression.  The patient verbalized understanding of the proper use and possible adverse effects of OCPs. All of the patient's questions and concerns were addressed.
Erythromycin Pregnancy And Lactation Text: This medication is Pregnancy Category B and is considered safe during pregnancy. It is also excreted in breast milk.
Aklief Pregnancy And Lactation Text: It is unknown if this medication is safe to use during pregnancy.  It is unknown if this medication is excreted in breast milk.  Breastfeeding women should use the topical cream on the smallest area of the skin for the shortest time needed while breastfeeding.  Do not apply to nipple and areola.
Sarecycline Counseling: Patient advised regarding possible photosensitivity and discoloration of the teeth, skin, lips, tongue and gums.  Patient instructed to avoid sunlight, if possible.  When exposed to sunlight, patients should wear protective clothing, sunglasses, and sunscreen.  The patient was instructed to call the office immediately if the following severe adverse effects occur:  hearing changes, easy bruising/bleeding, severe headache, or vision changes.  The patient verbalized understanding of the proper use and possible adverse effects of sarecycline.  All of the patient's questions and concerns were addressed.
Tazorac Counseling:  Patient advised that medication is irritating and drying.  Patient may need to apply sparingly and wash off after an hour before eventually leaving it on overnight.  The patient verbalized understanding of the proper use and possible adverse effects of tazorac.  All of the patient's questions and concerns were addressed.
Winlevi Pregnancy And Lactation Text: This medication is considered safe during pregnancy and breastfeeding.
Bactrim Counseling:  I discussed with the patient the risks of sulfa antibiotics including but not limited to GI upset, allergic reaction, drug rash, diarrhea, dizziness, photosensitivity, and yeast infections.  Rarely, more serious reactions can occur including but not limited to aplastic anemia, agranulocytosis, methemoglobinemia, blood dyscrasias, liver or kidney failure, lung infiltrates or desquamative/blistering drug rashes.
Doxycycline Pregnancy And Lactation Text: This medication is Pregnancy Category D and not consider safe during pregnancy. It is also excreted in breast milk but is considered safe for shorter treatment courses.
Minocycline Counseling: Patient advised regarding possible photosensitivity and discoloration of the teeth, skin, lips, tongue and gums.  Patient instructed to avoid sunlight, if possible.  When exposed to sunlight, patients should wear protective clothing, sunglasses, and sunscreen.  The patient was instructed to call the office immediately if the following severe adverse effects occur:  hearing changes, easy bruising/bleeding, severe headache, or vision changes.  The patient verbalized understanding of the proper use and possible adverse effects of minocycline.  All of the patient's questions and concerns were addressed.
Topical Retinoid counseling:  Patient advised to apply a pea-sized amount only at bedtime and wait 30 minutes after washing their face before applying.  If too drying, patient may add a non-comedogenic moisturizer. The patient verbalized understanding of the proper use and possible adverse effects of retinoids.  All of the patient's questions and concerns were addressed.
Topical Sulfur Applications Pregnancy And Lactation Text: This medication is Pregnancy Category C and has an unknown safety profile during pregnancy. It is unknown if this topical medication is excreted in breast milk.
Medical Necessity Information: LCD Guidelines vary from payer to payer. Please check with your payer's policy to determine medical necessity.
Medical Necessity Clause: Botulinum toxin hyperhidrosis therapy is medically necessary because

## 2024-06-10 ENCOUNTER — RX ONLY (OUTPATIENT)
Age: 44
Setting detail: RX ONLY
End: 2024-06-10

## 2024-06-10 RX ORDER — OXYBUTYNIN CHLORIDE 2.5 MG/1
TABLET ORAL
Qty: 90 | Refills: 1 | Status: ERX | COMMUNITY
Start: 2024-06-10

## 2024-12-23 ENCOUNTER — RX ONLY (RX ONLY)
Age: 44
End: 2024-12-23

## 2024-12-23 RX ORDER — SPIRONOLACTONE 50 MG/1
1 TABLET, FILM COATED ORAL BID
Qty: 180 | Refills: 1 | Status: ERX

## 2024-12-30 ENCOUNTER — RX ONLY (RX ONLY)
Age: 44
End: 2024-12-30

## 2024-12-30 RX ORDER — SPIRONOLACTONE 50 MG/1
1 TABLET, FILM COATED ORAL BID
Qty: 180 | Refills: 1 | Status: ERX

## 2025-03-06 ENCOUNTER — HOSPITAL ENCOUNTER (OUTPATIENT)
Facility: MEDICAL CENTER | Age: 45
End: 2025-03-06
Payer: COMMERCIAL

## 2025-03-06 ENCOUNTER — OFFICE VISIT (OUTPATIENT)
Dept: URGENT CARE | Facility: CLINIC | Age: 45
End: 2025-03-06
Payer: COMMERCIAL

## 2025-03-06 VITALS
BODY MASS INDEX: 20.89 KG/M2 | TEMPERATURE: 97.9 F | SYSTOLIC BLOOD PRESSURE: 110 MMHG | WEIGHT: 130 LBS | DIASTOLIC BLOOD PRESSURE: 66 MMHG | OXYGEN SATURATION: 98 % | RESPIRATION RATE: 14 BRPM | HEART RATE: 72 BPM | HEIGHT: 66 IN

## 2025-03-06 DIAGNOSIS — R30.0 DYSURIA: Primary | ICD-10-CM

## 2025-03-06 DIAGNOSIS — N30.01 ACUTE CYSTITIS WITH HEMATURIA: ICD-10-CM

## 2025-03-06 DIAGNOSIS — R30.0 DYSURIA: ICD-10-CM

## 2025-03-06 LAB
APPEARANCE UR: NORMAL
BILIRUB UR STRIP-MCNC: NEGATIVE MG/DL
COLOR UR AUTO: NORMAL
GLUCOSE UR STRIP.AUTO-MCNC: NEGATIVE MG/DL
KETONES UR STRIP.AUTO-MCNC: NEGATIVE MG/DL
LEUKOCYTE ESTERASE UR QL STRIP.AUTO: NORMAL
NITRITE UR QL STRIP.AUTO: POSITIVE
PH UR STRIP.AUTO: 6 [PH] (ref 5–8)
PROT UR QL STRIP: 30 MG/DL
RBC UR QL AUTO: NORMAL
SP GR UR STRIP.AUTO: 1.02
UROBILINOGEN UR STRIP-MCNC: 0.2 MG/DL

## 2025-03-06 PROCEDURE — 81002 URINALYSIS NONAUTO W/O SCOPE: CPT

## 2025-03-06 PROCEDURE — 3074F SYST BP LT 130 MM HG: CPT

## 2025-03-06 PROCEDURE — 87077 CULTURE AEROBIC IDENTIFY: CPT

## 2025-03-06 PROCEDURE — 3078F DIAST BP <80 MM HG: CPT

## 2025-03-06 PROCEDURE — 99213 OFFICE O/P EST LOW 20 MIN: CPT

## 2025-03-06 PROCEDURE — 87086 URINE CULTURE/COLONY COUNT: CPT

## 2025-03-06 PROCEDURE — 87186 SC STD MICRODIL/AGAR DIL: CPT

## 2025-03-06 RX ORDER — NITROFURANTOIN 25; 75 MG/1; MG/1
100 CAPSULE ORAL 2 TIMES DAILY
Qty: 10 CAPSULE | Refills: 0 | Status: SHIPPED | OUTPATIENT
Start: 2025-03-06 | End: 2025-03-11

## 2025-03-06 ASSESSMENT — FIBROSIS 4 INDEX: FIB4 SCORE: 1.36

## 2025-03-07 NOTE — PROGRESS NOTES
Verbal consent was acquired by the patient to use [employer] ambient listening note generation during this visit   Subjective:   Kimmie Elder is a 44 y.o. female who presents for UTI (X 1 day )      HPI:  History of Present Illness  The patient is a 44-year-old female who presents for evaluation of UTI symptoms.    She reports experiencing dysuria and hematuria, which she notes is not associated with her menstrual cycle. These symptoms began abruptly yesterday, consistent with her history of recurrent urinary tract infections (UTIs). She confirms the presence of hematuria today but reports no systemic symptoms such as fever, chills, or myalgia. Despite this, she reports a general feeling of malaise. She has been managing her symptoms with increased fluid intake. She has a past medical history of UTIs but notes a period of several years without any episodes. Her current medication regimen includes citalopram and AZO, the latter of which was administered approximately 24 hours ago.           Review of Systems   Constitutional:  Positive for malaise/fatigue.   Genitourinary:  Positive for dysuria and hematuria.       Medications:    Current Outpatient Medications on File Prior to Visit   Medication Sig Dispense Refill    citalopram (CELEXA) 40 MG Tab TAKE HALF OF A TABLET (20 MG) BY MOUTH DAILY      spironolactone (ALDACTONE) 50 MG Tab Take 50 mg by mouth 2 times a day. (Patient not taking: Reported on 3/6/2025)      tretinoin (RETIN-A) 0.05 % cream APPLY TOPICALLY TO AFFECTED AREAS NIGHTLY (Patient not taking: Reported on 3/6/2025) 45 g 0    ALPRAZolam (XANAX) 1 MG Tab Take 1 mg by mouth at bedtime as needed for Sleep. (Patient not taking: Reported on 3/6/2025)       No current facility-administered medications on file prior to visit.        Allergies:   Pcn [penicillins]    Problem List:   Patient Active Problem List   Diagnosis    Psoriasis    Acne cystica    Anxiety    Recurrent major depressive disorder, in  "full remission (HCC)    Hair loss    Hot flashes    Traveler's diarrhea    Other fatigue    UTI symptoms        Surgical History:  Past Surgical History:   Procedure Laterality Date    APPENDECTOMY  age 10       Past Social Hx:   Social History     Tobacco Use    Smoking status: Former     Current packs/day: 0.00     Types: Cigarettes     Quit date: 2021     Years since quittin.1    Smokeless tobacco: Never    Tobacco comments:     occ.   Vaping Use    Vaping status: Never Used   Substance Use Topics    Alcohol use: Not Currently     Alcohol/week: 0.6 oz     Types: 1 Cans of beer per week     Comment: occ    Drug use: No          Problem list, medications, and allergies reviewed by myself today in Epic.     Objective:     /66   Pulse 72   Temp 36.6 °C (97.9 °F) (Temporal)   Resp 14   Ht 1.676 m (5' 6\")   Wt 59 kg (130 lb)   SpO2 98%   BMI 20.98 kg/m²     Physical Exam  Vitals and nursing note reviewed.   Constitutional:       General: She is not in acute distress.     Appearance: Normal appearance. She is normal weight. She is not ill-appearing or toxic-appearing.   HENT:      Head: Normocephalic and atraumatic.   Cardiovascular:      Rate and Rhythm: Normal rate and regular rhythm.      Pulses: Normal pulses.      Heart sounds: Normal heart sounds. No murmur heard.     No friction rub. No gallop.   Pulmonary:      Effort: Pulmonary effort is normal. No respiratory distress.      Breath sounds: Normal breath sounds. No stridor. No wheezing, rhonchi or rales.   Chest:      Chest wall: No tenderness.   Abdominal:      Tenderness: There is no right CVA tenderness or left CVA tenderness.   Skin:     General: Skin is warm and dry.      Capillary Refill: Capillary refill takes less than 2 seconds.   Neurological:      General: No focal deficit present.      Mental Status: She is alert and oriented to person, place, and time. Mental status is at baseline.      Gait: Gait normal.   Psychiatric:         " Mood and Affect: Mood normal.         Behavior: Behavior normal.         Thought Content: Thought content normal.         Judgment: Judgment normal.         Assessment/Plan:     Diagnosis and associated orders:   1. Dysuria  - POCT Urinalysis  - URINE CULTURE(NEW); Future  - nitrofurantoin (MACROBID) 100 MG Cap; Take 1 Capsule by mouth 2 times a day for 5 days.  Dispense: 10 Capsule; Refill: 0    2. Acute cystitis with hematuria  - URINE CULTURE(NEW); Future  - nitrofurantoin (MACROBID) 100 MG Cap; Take 1 Capsule by mouth 2 times a day for 5 days.  Dispense: 10 Capsule; Refill: 0         Comments/MDM:   Pt is clinically stable at today's acute urgent care visit.  No acute distress noted. Appropriate for outpatient management at this time.     Assessment & Plan  1. Urinary Tract Infection (UTI).  Her urine sample exhibits signs of infection. A urine culture will be conducted to confirm the appropriateness of the prescribed antibiotic. She has been prescribed nitrofurantoin (Macrobid) to be taken twice daily for a duration of 5 days. She is advised to maintain high fluid intake. The prescription will be sent to the pharmacy. If a change in antibiotic is necessary based on culture results, she will be contacted; otherwise, she should continue with the current treatment plan.            Discussed DDx, management options (risks,benefits, and alternatives to planned treatment), natural progression and supportive care.  Expressed understanding and the treatment plan was agreed upon. Questions were encouraged and answered   Return to urgent care prn if new or worsening sx or if there is no improvement in condition prn.    Educated in Red flags and indications to immediately call 911 or present to the Emergency Department.   Advised the patient to follow-up with the primary care physician for recheck, reevaluation, and consideration of further management.    I personally reviewed prior external notes and test results  pertinent to today's visit.  I have independently reviewed and interpreted all diagnostics ordered during this urgent care acute visit.       Please note that this dictation was created using voice recognition software. I have made a reasonable attempt to correct obvious errors, but I expect that there are errors of grammar and possibly content that I did not discover before finalizing the note.    This note was electronically signed by WALKER Dumont

## 2025-03-09 LAB
BACTERIA UR CULT: ABNORMAL
BACTERIA UR CULT: ABNORMAL
SIGNIFICANT IND 70042: ABNORMAL
SITE SITE: ABNORMAL
SOURCE SOURCE: ABNORMAL

## 2025-03-10 ENCOUNTER — RESULTS FOLLOW-UP (OUTPATIENT)
Dept: URGENT CARE | Facility: CLINIC | Age: 45
End: 2025-03-10
Payer: COMMERCIAL

## 2025-03-27 ENCOUNTER — APPOINTMENT (OUTPATIENT)
Dept: URBAN - METROPOLITAN AREA CLINIC 6 | Facility: CLINIC | Age: 45
Setting detail: DERMATOLOGY
End: 2025-03-27

## 2025-03-27 DIAGNOSIS — L70.8 OTHER ACNE: ICD-10-CM

## 2025-03-27 DIAGNOSIS — Z71.89 OTHER SPECIFIED COUNSELING: ICD-10-CM

## 2025-03-27 DIAGNOSIS — L74.51 PRIMARY FOCAL HYPERHIDROSIS: ICD-10-CM

## 2025-03-27 DIAGNOSIS — K13.0 DISEASES OF LIPS: ICD-10-CM

## 2025-03-27 PROBLEM — L74.519 PRIMARY FOCAL HYPERHIDROSIS, UNSPECIFIED: Status: ACTIVE | Noted: 2025-03-27

## 2025-03-27 PROCEDURE — ? PRESCRIPTION

## 2025-03-27 PROCEDURE — 99214 OFFICE O/P EST MOD 30 MIN: CPT

## 2025-03-27 PROCEDURE — ? ORDER TESTS

## 2025-03-27 PROCEDURE — ? PHOTO-DOCUMENTATION

## 2025-03-27 PROCEDURE — ? PRESCRIPTION MEDICATION MANAGEMENT

## 2025-03-27 PROCEDURE — ? COUNSELING

## 2025-03-27 RX ORDER — GLYCOPYRROLATE 1 MG/1
1 TABLET ORAL BID
Qty: 180 | Refills: 1 | Status: ERX | COMMUNITY
Start: 2025-03-27

## 2025-03-27 RX ORDER — TRETIONIN 1 MG/G
CREAM TOPICAL QHS
Qty: 45 | Refills: 5 | Status: ERX

## 2025-03-27 RX ORDER — SPIRONOLACTONE 100 MG/1
1 TABLET, FILM COATED ORAL BID
Qty: 180 | Refills: 1 | Status: ERX | COMMUNITY
Start: 2025-03-27

## 2025-03-27 RX ADMIN — GLYCOPYRROLATE 1: 1 TABLET ORAL at 00:00

## 2025-03-27 RX ADMIN — SPIRONOLACTONE 1: 100 TABLET, FILM COATED ORAL at 00:00

## 2025-03-27 ASSESSMENT — LOCATION DETAILED DESCRIPTION DERM
LOCATION DETAILED: RIGHT INFERIOR VERMILION LIP
LOCATION DETAILED: RIGHT INFERIOR CENTRAL MALAR CHEEK
LOCATION DETAILED: LEFT INFERIOR CENTRAL MALAR CHEEK

## 2025-03-27 ASSESSMENT — LOCATION SIMPLE DESCRIPTION DERM
LOCATION SIMPLE: RIGHT LIP
LOCATION SIMPLE: RIGHT CHEEK
LOCATION SIMPLE: LEFT CHEEK

## 2025-03-27 ASSESSMENT — LOCATION ZONE DERM
LOCATION ZONE: FACE
LOCATION ZONE: LIP

## 2025-03-27 NOTE — PROCEDURE: PRESCRIPTION MEDICATION MANAGEMENT
Detail Level: Zone
Render In Strict Bullet Format?: No
Modify Regimen: Increase spironolactone to 100 mg BID (pt increased at home already for the past week and denies side effects)
Continue Regimen: benzoyl peroxide 10% wash once daily\\ntretinoin 0.1% cream once daily at bedtime
Initiate Treatment: Glycopyrrolate 1 mg tab BID
Plan: Pt was unable to get Oxybutynin due to cost

## 2025-03-27 NOTE — PROCEDURE: ORDER TESTS
Expected Date Of Service: 03/27/2025
Performing Laboratory: 0
Billing Type: Third-Party Bill
Bill For Surgical Tray: no